# Patient Record
Sex: MALE | Race: WHITE | Employment: FULL TIME | URBAN - METROPOLITAN AREA
[De-identification: names, ages, dates, MRNs, and addresses within clinical notes are randomized per-mention and may not be internally consistent; named-entity substitution may affect disease eponyms.]

---

## 2018-12-06 ENCOUNTER — HOSPITAL ENCOUNTER (EMERGENCY)
Age: 43
Discharge: HOME | End: 2018-12-06
Attending: EMERGENCY MEDICINE
Payer: COMMERCIAL

## 2018-12-06 ENCOUNTER — APPOINTMENT (OUTPATIENT)
Dept: GENERAL RADIOLOGY | Age: 43
End: 2018-12-06
Payer: COMMERCIAL

## 2018-12-06 VITALS
SYSTOLIC BLOOD PRESSURE: 141 MMHG | RESPIRATION RATE: 16 BRPM | DIASTOLIC BLOOD PRESSURE: 73 MMHG | HEART RATE: 75 BPM | TEMPERATURE: 97.9 F | OXYGEN SATURATION: 98 %

## 2018-12-06 DIAGNOSIS — R42 LIGHTHEADEDNESS: Primary | ICD-10-CM

## 2018-12-06 LAB
ALBUMIN SERPL-MCNC: 4.6 G/DL (ref 3.5–5.2)
ALP BLD-CCNC: 84 U/L (ref 40–129)
ALT SERPL-CCNC: 37 U/L (ref 0–40)
ANION GAP SERPL CALCULATED.3IONS-SCNC: 13 MMOL/L (ref 7–16)
AST SERPL-CCNC: 23 U/L (ref 0–39)
BASOPHILS ABSOLUTE: 0.05 E9/L (ref 0–0.2)
BASOPHILS RELATIVE PERCENT: 0.9 % (ref 0–2)
BILIRUB SERPL-MCNC: 0.5 MG/DL (ref 0–1.2)
BUN BLDV-MCNC: 16 MG/DL (ref 6–20)
CALCIUM SERPL-MCNC: 9.2 MG/DL (ref 8.6–10.2)
CHLORIDE BLD-SCNC: 101 MMOL/L (ref 98–107)
CO2: 23 MMOL/L (ref 22–29)
CREAT SERPL-MCNC: 0.9 MG/DL (ref 0.7–1.2)
D DIMER: <200 NG/ML DDU
EKG ATRIAL RATE: 64 BPM
EKG P AXIS: 52 DEGREES
EKG P-R INTERVAL: 132 MS
EKG Q-T INTERVAL: 410 MS
EKG QRS DURATION: 94 MS
EKG QTC CALCULATION (BAZETT): 422 MS
EKG R AXIS: 74 DEGREES
EKG T AXIS: 43 DEGREES
EKG VENTRICULAR RATE: 64 BPM
EOSINOPHILS ABSOLUTE: 0.16 E9/L (ref 0.05–0.5)
EOSINOPHILS RELATIVE PERCENT: 2.8 % (ref 0–6)
GFR AFRICAN AMERICAN: >60
GFR NON-AFRICAN AMERICAN: >60 ML/MIN/1.73
GLUCOSE BLD-MCNC: 105 MG/DL (ref 74–99)
HCT VFR BLD CALC: 45.7 % (ref 37–54)
HEMOGLOBIN: 16.1 G/DL (ref 12.5–16.5)
IMMATURE GRANULOCYTES #: 0.02 E9/L
IMMATURE GRANULOCYTES %: 0.3 % (ref 0–5)
LYMPHOCYTES ABSOLUTE: 1.26 E9/L (ref 1.5–4)
LYMPHOCYTES RELATIVE PERCENT: 21.9 % (ref 20–42)
MAGNESIUM: 2.2 MG/DL (ref 1.6–2.6)
MCH RBC QN AUTO: 31.2 PG (ref 26–35)
MCHC RBC AUTO-ENTMCNC: 35.2 % (ref 32–34.5)
MCV RBC AUTO: 88.6 FL (ref 80–99.9)
MONOCYTES ABSOLUTE: 0.74 E9/L (ref 0.1–0.95)
MONOCYTES RELATIVE PERCENT: 12.9 % (ref 2–12)
NEUTROPHILS ABSOLUTE: 3.52 E9/L (ref 1.8–7.3)
NEUTROPHILS RELATIVE PERCENT: 61.2 % (ref 43–80)
PDW BLD-RTO: 12.4 FL (ref 11.5–15)
PLATELET # BLD: 298 E9/L (ref 130–450)
PMV BLD AUTO: 9.7 FL (ref 7–12)
POTASSIUM SERPL-SCNC: 4.1 MMOL/L (ref 3.5–5)
RBC # BLD: 5.16 E12/L (ref 3.8–5.8)
SODIUM BLD-SCNC: 137 MMOL/L (ref 132–146)
TOTAL PROTEIN: 7.1 G/DL (ref 6.4–8.3)
TROPONIN: <0.01 NG/ML (ref 0–0.03)
WBC # BLD: 5.8 E9/L (ref 4.5–11.5)

## 2018-12-06 PROCEDURE — 80053 COMPREHEN METABOLIC PANEL: CPT

## 2018-12-06 PROCEDURE — 2580000003 HC RX 258: Performed by: EMERGENCY MEDICINE

## 2018-12-06 PROCEDURE — 85378 FIBRIN DEGRADE SEMIQUANT: CPT

## 2018-12-06 PROCEDURE — 93005 ELECTROCARDIOGRAM TRACING: CPT | Performed by: EMERGENCY MEDICINE

## 2018-12-06 PROCEDURE — 83735 ASSAY OF MAGNESIUM: CPT

## 2018-12-06 PROCEDURE — 85025 COMPLETE CBC W/AUTO DIFF WBC: CPT

## 2018-12-06 PROCEDURE — 71045 X-RAY EXAM CHEST 1 VIEW: CPT

## 2018-12-06 PROCEDURE — 84484 ASSAY OF TROPONIN QUANT: CPT

## 2018-12-06 PROCEDURE — 99284 EMERGENCY DEPT VISIT MOD MDM: CPT

## 2018-12-06 RX ORDER — HYDROXYZINE PAMOATE 50 MG/1
50 CAPSULE ORAL 3 TIMES DAILY PRN
Qty: 30 CAPSULE | Refills: 0 | Status: SHIPPED | OUTPATIENT
Start: 2018-12-06 | End: 2018-12-16

## 2018-12-06 RX ORDER — 0.9 % SODIUM CHLORIDE 0.9 %
500 INTRAVENOUS SOLUTION INTRAVENOUS ONCE
Status: COMPLETED | OUTPATIENT
Start: 2018-12-06 | End: 2018-12-06

## 2018-12-06 RX ADMIN — SODIUM CHLORIDE 500 ML: 9 INJECTION, SOLUTION INTRAVENOUS at 11:48

## 2018-12-06 NOTE — ED PROVIDER NOTES
Platelets [phone] 472 E9/L    MPV 9.7 7.0 - 12.0 fL    Neutrophils % 61.2 43.0 - 80.0 %    Immature Granulocytes % 0.3 0.0 - 5.0 %    Lymphocytes % 21.9 20.0 - 42.0 %    Monocytes % 12.9 (H) 2.0 - 12.0 %    Eosinophils % 2.8 0.0 - 6.0 %    Basophils % 0.9 0.0 - 2.0 %    Neutrophils # 3.52 1.80 - 7.30 E9/L    Immature Granulocytes # 0.02 E9/L    Lymphocytes # 1.26 (L) 1.50 - 4.00 E9/L    Monocytes # 0.74 0.10 - 0.95 E9/L    Eosinophils # 0.16 0.05 - 0.50 E9/L    Basophils # 0.05 0.00 - 0.20 E9/L   Comprehensive Metabolic Panel   Result Value Ref Range    Sodium 137 132 - 146 mmol/L    Potassium 4.1 3.5 - 5.0 mmol/L    Chloride 101 98 - 107 mmol/L    CO2 23 22 - 29 mmol/L    Anion Gap 13 7 - 16 mmol/L    Glucose 105 (H) 74 - 99 mg/dL    BUN 16 6 - 20 mg/dL    CREATININE 0.9 0.7 - 1.2 mg/dL    GFR Non-African American >60 >=60 mL/min/1.73    GFR African American >60     Calcium 9.2 8.6 - 10.2 mg/dL    Total Protein 7.1 6.4 - 8.3 g/dL    Alb 4.6 3.5 - 5.2 g/dL    Total Bilirubin 0.5 0.0 - 1.2 mg/dL    Alkaline Phosphatase 84 40 - 129 U/L    ALT 37 0 - 40 U/L    AST 23 0 - 39 U/L   Troponin   Result Value Ref Range    Troponin <0.01 0.00 - 0.03 ng/mL   Magnesium   Result Value Ref Range    Magnesium 2.2 1.6 - 2.6 mg/dL   D-DIMER, QUANTITATIVE   Result Value Ref Range    D-Dimer, Quant <200 ng/mL DDU   EKG 12 Lead   Result Value Ref Range    Ventricular Rate 64 BPM    Atrial Rate 64 BPM    P-R Interval 132 ms    QRS Duration 94 ms    Q-T Interval 410 ms    QTc Calculation (Bazett) 422 ms    P Axis 52 degrees    R Axis 74 degrees    T Axis 43 degrees       RADIOLOGY:  Interpreted by Radiologist.  XR CHEST PORTABLE   Final Result   No radiographic evidence of acute cardiopulmonary disease. EKG: This EKG is signed and interpreted by me.     Rate: 66  Rhythm: Sinus  Interpretation: no acute changes  Comparison: None    ------------------------- NURSING NOTES AND VITALS REVIEWED ---------------------------   The agreeable with the plan.      --------------------------------- IMPRESSION AND DISPOSITION ---------------------------------    IMPRESSION  1. Lightheadedness        DISPOSITION  Disposition: Discharge to home  Patient condition is stable    12/6/18, 11:25 AM.    This note is prepared by Damaris Gallego, acting as Scribe for Koki Godinez DO:  The scribe's documentation has been prepared under my direction and personally reviewed by me in its entirety. I confirm that the note above accurately reflects all work, treatment, procedures, and medical decision making performed by me.              Katja Simeon DO  12/06/18 3761

## 2019-09-10 ENCOUNTER — APPOINTMENT (OUTPATIENT)
Dept: CT IMAGING | Age: 44
End: 2019-09-10
Payer: COMMERCIAL

## 2019-09-10 ENCOUNTER — APPOINTMENT (OUTPATIENT)
Dept: ULTRASOUND IMAGING | Age: 44
End: 2019-09-10
Payer: COMMERCIAL

## 2019-09-10 ENCOUNTER — HOSPITAL ENCOUNTER (EMERGENCY)
Age: 44
Discharge: HOME | End: 2019-09-10
Attending: EMERGENCY MEDICINE
Payer: COMMERCIAL

## 2019-09-10 VITALS
OXYGEN SATURATION: 97 % | HEART RATE: 89 BPM | DIASTOLIC BLOOD PRESSURE: 82 MMHG | TEMPERATURE: 99.5 F | BODY MASS INDEX: 33.34 KG/M2 | SYSTOLIC BLOOD PRESSURE: 147 MMHG | WEIGHT: 220 LBS | RESPIRATION RATE: 18 BRPM | HEIGHT: 68 IN

## 2019-09-10 DIAGNOSIS — N45.2 ORCHITIS: Primary | ICD-10-CM

## 2019-09-10 DIAGNOSIS — R04.2 HEMOPTYSIS: ICD-10-CM

## 2019-09-10 DIAGNOSIS — N43.3 HYDROCELE, UNSPECIFIED HYDROCELE TYPE: ICD-10-CM

## 2019-09-10 LAB
ANION GAP SERPL CALCULATED.3IONS-SCNC: 10 MMOL/L (ref 7–16)
BACTERIA: NORMAL /HPF
BASOPHILS ABSOLUTE: 0.03 E9/L (ref 0–0.2)
BASOPHILS RELATIVE PERCENT: 0.3 % (ref 0–2)
BILIRUBIN URINE: NEGATIVE
BLOOD, URINE: NEGATIVE
BUN BLDV-MCNC: 15 MG/DL (ref 6–20)
CALCIUM SERPL-MCNC: 9.3 MG/DL (ref 8.6–10.2)
CHLORIDE BLD-SCNC: 100 MMOL/L (ref 98–107)
CLARITY: CLEAR
CO2: 26 MMOL/L (ref 22–29)
COLOR: YELLOW
CREAT SERPL-MCNC: 0.9 MG/DL (ref 0.7–1.2)
D DIMER: 406 NG/ML DDU
EKG ATRIAL RATE: 82 BPM
EKG P AXIS: 37 DEGREES
EKG P-R INTERVAL: 126 MS
EKG Q-T INTERVAL: 352 MS
EKG QRS DURATION: 92 MS
EKG QTC CALCULATION (BAZETT): 411 MS
EKG R AXIS: 74 DEGREES
EKG T AXIS: 1 DEGREES
EKG VENTRICULAR RATE: 82 BPM
EOSINOPHILS ABSOLUTE: 0.02 E9/L (ref 0.05–0.5)
EOSINOPHILS RELATIVE PERCENT: 0.2 % (ref 0–6)
GFR AFRICAN AMERICAN: >60
GFR NON-AFRICAN AMERICAN: >60 ML/MIN/1.73
GLUCOSE BLD-MCNC: 88 MG/DL (ref 74–99)
GLUCOSE URINE: NEGATIVE MG/DL
HCT VFR BLD CALC: 41.7 % (ref 37–54)
HEMOGLOBIN: 14.4 G/DL (ref 12.5–16.5)
IMMATURE GRANULOCYTES #: 0.03 E9/L
IMMATURE GRANULOCYTES %: 0.3 % (ref 0–5)
KETONES, URINE: NEGATIVE MG/DL
LACTIC ACID: 0.8 MMOL/L (ref 0.5–2.2)
LEUKOCYTE ESTERASE, URINE: NEGATIVE
LYMPHOCYTES ABSOLUTE: 1.06 E9/L (ref 1.5–4)
LYMPHOCYTES RELATIVE PERCENT: 11.4 % (ref 20–42)
MCH RBC QN AUTO: 30.5 PG (ref 26–35)
MCHC RBC AUTO-ENTMCNC: 34.5 % (ref 32–34.5)
MCV RBC AUTO: 88.3 FL (ref 80–99.9)
MONOCYTES ABSOLUTE: 1.09 E9/L (ref 0.1–0.95)
MONOCYTES RELATIVE PERCENT: 11.8 % (ref 2–12)
NEUTROPHILS ABSOLUTE: 7.04 E9/L (ref 1.8–7.3)
NEUTROPHILS RELATIVE PERCENT: 76 % (ref 43–80)
NITRITE, URINE: NEGATIVE
PDW BLD-RTO: 11.9 FL (ref 11.5–15)
PH UA: 6.5 (ref 5–9)
PLATELET # BLD: 248 E9/L (ref 130–450)
PMV BLD AUTO: 10.1 FL (ref 7–12)
POTASSIUM SERPL-SCNC: 3.9 MMOL/L (ref 3.5–5)
PRO-BNP: 323 PG/ML (ref 0–125)
PROTEIN UA: NEGATIVE MG/DL
RBC # BLD: 4.72 E12/L (ref 3.8–5.8)
RBC UA: NORMAL /HPF (ref 0–2)
SODIUM BLD-SCNC: 136 MMOL/L (ref 132–146)
SPECIFIC GRAVITY UA: 1.01 (ref 1–1.03)
TROPONIN: <0.01 NG/ML (ref 0–0.03)
UROBILINOGEN, URINE: 1 E.U./DL
WBC # BLD: 9.3 E9/L (ref 4.5–11.5)
WBC UA: NORMAL /HPF (ref 0–5)

## 2019-09-10 PROCEDURE — 76870 US EXAM SCROTUM: CPT

## 2019-09-10 PROCEDURE — 85378 FIBRIN DEGRADE SEMIQUANT: CPT

## 2019-09-10 PROCEDURE — 6360000004 HC RX CONTRAST MEDICATION: Performed by: RADIOLOGY

## 2019-09-10 PROCEDURE — 83880 ASSAY OF NATRIURETIC PEPTIDE: CPT

## 2019-09-10 PROCEDURE — 99285 EMERGENCY DEPT VISIT HI MDM: CPT

## 2019-09-10 PROCEDURE — 93010 ELECTROCARDIOGRAM REPORT: CPT | Performed by: INTERNAL MEDICINE

## 2019-09-10 PROCEDURE — 6370000000 HC RX 637 (ALT 250 FOR IP): Performed by: EMERGENCY MEDICINE

## 2019-09-10 PROCEDURE — 83605 ASSAY OF LACTIC ACID: CPT

## 2019-09-10 PROCEDURE — 6360000002 HC RX W HCPCS: Performed by: EMERGENCY MEDICINE

## 2019-09-10 PROCEDURE — 87088 URINE BACTERIA CULTURE: CPT

## 2019-09-10 PROCEDURE — 87591 N.GONORRHOEAE DNA AMP PROB: CPT

## 2019-09-10 PROCEDURE — 84484 ASSAY OF TROPONIN QUANT: CPT

## 2019-09-10 PROCEDURE — 87491 CHLMYD TRACH DNA AMP PROBE: CPT

## 2019-09-10 PROCEDURE — 93005 ELECTROCARDIOGRAM TRACING: CPT | Performed by: EMERGENCY MEDICINE

## 2019-09-10 PROCEDURE — 71275 CT ANGIOGRAPHY CHEST: CPT

## 2019-09-10 PROCEDURE — 2500000003 HC RX 250 WO HCPCS: Performed by: EMERGENCY MEDICINE

## 2019-09-10 PROCEDURE — 81001 URINALYSIS AUTO W/SCOPE: CPT

## 2019-09-10 PROCEDURE — 85025 COMPLETE CBC W/AUTO DIFF WBC: CPT

## 2019-09-10 PROCEDURE — 80048 BASIC METABOLIC PNL TOTAL CA: CPT

## 2019-09-10 PROCEDURE — 93975 VASCULAR STUDY: CPT

## 2019-09-10 PROCEDURE — 96372 THER/PROPH/DIAG INJ SC/IM: CPT

## 2019-09-10 RX ORDER — CIPROFLOXACIN 500 MG/1
500 TABLET, FILM COATED ORAL 2 TIMES DAILY
Qty: 20 TABLET | Refills: 0 | Status: SHIPPED | OUTPATIENT
Start: 2019-09-10 | End: 2019-09-20

## 2019-09-10 RX ORDER — AZITHROMYCIN 250 MG/1
1000 TABLET, FILM COATED ORAL ONCE
Status: COMPLETED | OUTPATIENT
Start: 2019-09-10 | End: 2019-09-10

## 2019-09-10 RX ADMIN — CEFTRIAXONE SODIUM 250 MG: 250 INJECTION, POWDER, FOR SOLUTION INTRAMUSCULAR; INTRAVENOUS at 18:05

## 2019-09-10 RX ADMIN — AZITHROMYCIN 1000 MG: 250 TABLET, FILM COATED ORAL at 18:05

## 2019-09-10 RX ADMIN — IOPAMIDOL 85 ML: 755 INJECTION, SOLUTION INTRAVENOUS at 19:10

## 2019-09-10 ASSESSMENT — PAIN SCALES - GENERAL: PAINLEVEL_OUTOF10: 5

## 2019-09-10 ASSESSMENT — PAIN DESCRIPTION - LOCATION: LOCATION: HEAD

## 2019-09-10 ASSESSMENT — PAIN DESCRIPTION - ORIENTATION: ORIENTATION: LEFT

## 2019-09-10 NOTE — ED PROVIDER NOTES
7.0 - 12.0 fL    Neutrophils % 76.0 43.0 - 80.0 %    Immature Granulocytes % 0.3 0.0 - 5.0 %    Lymphocytes % 11.4 (L) 20.0 - 42.0 %    Monocytes % 11.8 2.0 - 12.0 %    Eosinophils % 0.2 0.0 - 6.0 %    Basophils % 0.3 0.0 - 2.0 %    Neutrophils Absolute 7.04 1.80 - 7.30 E9/L    Immature Granulocytes # 0.03 E9/L    Lymphocytes Absolute 1.06 (L) 1.50 - 4.00 E9/L    Monocytes Absolute 1.09 (H) 0.10 - 0.95 E9/L    Eosinophils Absolute 0.02 (L) 0.05 - 0.50 E9/L    Basophils Absolute 0.03 0.00 - 0.20 Z3/G   Basic Metabolic Panel   Result Value Ref Range    Sodium 136 132 - 146 mmol/L    Potassium 3.9 3.5 - 5.0 mmol/L    Chloride 100 98 - 107 mmol/L    CO2 26 22 - 29 mmol/L    Anion Gap 10 7 - 16 mmol/L    Glucose 88 74 - 99 mg/dL    BUN 15 6 - 20 mg/dL    CREATININE 0.9 0.7 - 1.2 mg/dL    GFR Non-African American >60 >=60 mL/min/1.73    GFR African American >60     Calcium 9.3 8.6 - 10.2 mg/dL   Troponin   Result Value Ref Range    Troponin <0.01 0.00 - 0.03 ng/mL   D-Dimer, Quantitative   Result Value Ref Range    D-Dimer, Quant 406 ng/mL DDU   Brain Natriuretic Peptide   Result Value Ref Range    Pro- (H) 0 - 125 pg/mL   Lactic Acid, Plasma   Result Value Ref Range    Lactic Acid 0.8 0.5 - 2.2 mmol/L   Urinalysis with Microscopic   Result Value Ref Range    Color, UA Yellow Straw/Yellow    Clarity, UA Clear Clear    Glucose, Ur Negative Negative mg/dL    Bilirubin Urine Negative Negative    Ketones, Urine Negative Negative mg/dL    Specific Gravity, UA 1.015 1.005 - 1.030    Blood, Urine Negative Negative    pH, UA 6.5 5.0 - 9.0    Protein, UA Negative Negative mg/dL    Urobilinogen, Urine 1.0 <2.0 E.U./dL    Nitrite, Urine Negative Negative    Leukocyte Esterase, Urine Negative Negative    WBC, UA NONE 0 - 5 /HPF    RBC, UA 0-1 0 - 2 /HPF    Bacteria, UA NONE /HPF   EKG 12 Lead   Result Value Ref Range    Ventricular Rate 82 BPM    Atrial Rate 82 BPM    P-R Interval 126 ms    QRS Duration 92 ms    Q-T Interval

## 2019-09-10 NOTE — DISCHARGE INSTR - COC
output data in the 24 hours ending 09/10/19 1930  No intake/output data recorded.     Safety Concerns:     [address] [facility] Safety Concerns:832986587}    Impairments/Disabilities:      [address] [facility] Impairments/Disabilities:451314892}    Nutrition Therapy:  Current Nutrition Therapy:   [address] [facility] Diet List:053820364}    Routes of Feeding: {CHP DME Other Feedings:363739945}  Liquids: {Slp liquid thickness:99371}  Daily Fluid Restriction: {CHP DME Yes amt example:524484959}  Last Modified Barium Swallow with Video (Video Swallowing Test): {Done Not Done RFIX:615431100}    Treatments at the Time of Hospital Discharge:   Respiratory Treatments: ***  Oxygen Therapy:  {Therapy; copd oxygen:12142}  Ventilator:    { CC Vent DBFX:070274522}    Rehab Therapies: {THERAPEUTIC INTERVENTION:9123235015}  Weight Bearing Status/Restrictions: [facility] Erlinda Pederson  Weight Bearin}  Other Medical Equipment (for information only, NOT a DME order):  {EQUIPMENT:834949166}  Other Treatments: ***    Patient's personal belongings (please select all that are sent with patient):  {CHP DME Belongings:937644574}    RN SIGNATURE:  {Esignature:431578351}    CASE MANAGEMENT/SOCIAL WORK SECTION    Inpatient Status Date: ***    Readmission Risk Assessment Score:  Readmission Risk              Risk of Unplanned Readmission:        0           Discharging to Facility/ Agency   · Name:   · Address:  · Phone:  · Fax:    Dialysis Facility (if applicable)   · Name:  · Address:  · Dialysis Schedule:  · Phone:  · Fax:    / signature: {Esignature:677111829}    PHYSICIAN SECTION    Prognosis: {Prognosis:0457492835}    Condition at Discharge: [address] Patient Condition:593396613}    Rehab Potential (if transferring to Rehab): {Prognosis:8581030347}    Recommended Labs or Other Treatments After Discharge: ***    Physician Certification: I certify the above information and transfer of Kasia Larry  is necessary for the continuing treatment of the diagnosis

## 2019-09-13 LAB
URINE CULTURE, ROUTINE: NORMAL
URINE CULTURE, ROUTINE: NORMAL

## 2019-09-16 LAB
C. TRACHOMATIS DNA ,URINE: NEGATIVE
N. GONORRHOEAE DNA, URINE: NEGATIVE
SOURCE: NORMAL

## 2020-09-29 RX ORDER — PREDNISONE 10 MG/1
TABLET ORAL
Qty: 18 TABLET | Refills: 0 | Status: SHIPPED | OUTPATIENT
Start: 2020-09-29 | End: 2020-10-08

## 2020-11-09 ENCOUNTER — OFFICE VISIT (OUTPATIENT)
Dept: PRIMARY CARE CLINIC | Age: 45
End: 2020-11-09

## 2021-07-16 ENCOUNTER — OFFICE VISIT (OUTPATIENT)
Dept: FAMILY MEDICINE CLINIC | Age: 46
End: 2021-07-16
Payer: COMMERCIAL

## 2021-07-16 VITALS
HEART RATE: 74 BPM | BODY MASS INDEX: 33.8 KG/M2 | OXYGEN SATURATION: 98 % | SYSTOLIC BLOOD PRESSURE: 134 MMHG | WEIGHT: 223 LBS | TEMPERATURE: 97.2 F | HEIGHT: 68 IN | DIASTOLIC BLOOD PRESSURE: 88 MMHG

## 2021-07-16 DIAGNOSIS — Z00.00 ROUTINE GENERAL MEDICAL EXAMINATION AT A HEALTH CARE FACILITY: Primary | ICD-10-CM

## 2021-07-16 DIAGNOSIS — Z12.5 PROSTATE CANCER SCREENING: ICD-10-CM

## 2021-07-16 DIAGNOSIS — Z00.00 ROUTINE GENERAL MEDICAL EXAMINATION AT A HEALTH CARE FACILITY: ICD-10-CM

## 2021-07-16 LAB
ALBUMIN SERPL-MCNC: 4.3 G/DL (ref 3.5–5.2)
ALP BLD-CCNC: 73 U/L (ref 40–129)
ALT SERPL-CCNC: 27 U/L (ref 0–40)
ANION GAP SERPL CALCULATED.3IONS-SCNC: 13 MMOL/L (ref 7–16)
AST SERPL-CCNC: 20 U/L (ref 0–39)
BILIRUB SERPL-MCNC: 0.5 MG/DL (ref 0–1.2)
BILIRUBIN URINE: NEGATIVE
BLOOD, URINE: NEGATIVE
BUN BLDV-MCNC: 18 MG/DL (ref 6–20)
CALCIUM SERPL-MCNC: 9.6 MG/DL (ref 8.6–10.2)
CHLORIDE BLD-SCNC: 104 MMOL/L (ref 98–107)
CHOLESTEROL, TOTAL: 203 MG/DL (ref 0–199)
CLARITY: CLEAR
CO2: 26 MMOL/L (ref 22–29)
COLOR: YELLOW
CREAT SERPL-MCNC: 0.9 MG/DL (ref 0.7–1.2)
GFR AFRICAN AMERICAN: >60
GFR NON-AFRICAN AMERICAN: >60 ML/MIN/1.73
GLUCOSE FASTING: 106 MG/DL (ref 74–99)
GLUCOSE URINE: NEGATIVE MG/DL
HCT VFR BLD CALC: 44.5 % (ref 37–54)
HDLC SERPL-MCNC: 49 MG/DL
HEMOGLOBIN: 15.4 G/DL (ref 12.5–16.5)
KETONES, URINE: NEGATIVE MG/DL
LDL CHOLESTEROL CALCULATED: 128 MG/DL (ref 0–99)
LEUKOCYTE ESTERASE, URINE: NEGATIVE
MCH RBC QN AUTO: 31 PG (ref 26–35)
MCHC RBC AUTO-ENTMCNC: 34.6 % (ref 32–34.5)
MCV RBC AUTO: 89.7 FL (ref 80–99.9)
NITRITE, URINE: NEGATIVE
PDW BLD-RTO: 12.3 FL (ref 11.5–15)
PH UA: 6 (ref 5–9)
PLATELET # BLD: 301 E9/L (ref 130–450)
PMV BLD AUTO: 10.4 FL (ref 7–12)
POTASSIUM SERPL-SCNC: 4.1 MMOL/L (ref 3.5–5)
PROSTATE SPECIFIC ANTIGEN: 0.82 NG/ML (ref 0–4)
PROTEIN UA: NEGATIVE MG/DL
RBC # BLD: 4.96 E12/L (ref 3.8–5.8)
SODIUM BLD-SCNC: 143 MMOL/L (ref 132–146)
SPECIFIC GRAVITY UA: 1.01 (ref 1–1.03)
TOTAL PROTEIN: 7.2 G/DL (ref 6.4–8.3)
TRIGL SERPL-MCNC: 132 MG/DL (ref 0–149)
TSH SERPL DL<=0.05 MIU/L-ACNC: 0.56 UIU/ML (ref 0.27–4.2)
UROBILINOGEN, URINE: 0.2 E.U./DL
VLDLC SERPL CALC-MCNC: 26 MG/DL
WBC # BLD: 5.3 E9/L (ref 4.5–11.5)

## 2021-07-16 PROCEDURE — 99396 PREV VISIT EST AGE 40-64: CPT | Performed by: FAMILY MEDICINE

## 2021-07-16 ASSESSMENT — ENCOUNTER SYMPTOMS
ALLERGIC/IMMUNOLOGIC NEGATIVE: 1
COUGH: 0
SHORTNESS OF BREATH: 0
EYE DISCHARGE: 0
EYE PAIN: 0
NAUSEA: 0
VOMITING: 0
DIARRHEA: 0
PHOTOPHOBIA: 0
EYE REDNESS: 0
SORE THROAT: 0
BLOOD IN STOOL: 0
CHEST TIGHTNESS: 0
SINUS PAIN: 0
TROUBLE SWALLOWING: 0
ABDOMINAL PAIN: 0
BACK PAIN: 0

## 2021-07-16 ASSESSMENT — PATIENT HEALTH QUESTIONNAIRE - PHQ9
1. LITTLE INTEREST OR PLEASURE IN DOING THINGS: 0
SUM OF ALL RESPONSES TO PHQ QUESTIONS 1-9: 0
SUM OF ALL RESPONSES TO PHQ QUESTIONS 1-9: 0
SUM OF ALL RESPONSES TO PHQ9 QUESTIONS 1 & 2: 0
SUM OF ALL RESPONSES TO PHQ QUESTIONS 1-9: 0
2. FEELING DOWN, DEPRESSED OR HOPELESS: 0

## 2021-07-16 NOTE — PROGRESS NOTES
21  Laura Ace : 1975 Sex: male  Age: 42 y.o. Assessment and Plan:  Marleen Bennett was seen today for annual exam.    Diagnoses and all orders for this visit:    Routine general medical examination at a health care facility    Prostate cancer screening        No follow-ups on file. Chief Complaint   Patient presents with    Annual Exam       Patient here for annual wellness visit. Been doing well, denies any new complaints. Following with urology for his testicular her history. All words to this point have been fine. Review of Systems   Constitutional: Negative for appetite change, fatigue and unexpected weight change. HENT: Negative for congestion, ear pain, hearing loss, sinus pain, sore throat and trouble swallowing. Eyes: Negative for photophobia, pain, discharge and redness. Respiratory: Negative for cough, chest tightness and shortness of breath. Cardiovascular: Negative for chest pain, palpitations and leg swelling. Gastrointestinal: Negative for abdominal pain, blood in stool, diarrhea, nausea and vomiting. Endocrine: Negative. Genitourinary: Negative for dysuria, flank pain, frequency and hematuria. Musculoskeletal: Negative for arthralgias, back pain, joint swelling and myalgias. Skin: Negative. Allergic/Immunologic: Negative. Neurological: Negative for dizziness, seizures, syncope, weakness, light-headedness, numbness and headaches. Hematological: Negative for adenopathy. Does not bruise/bleed easily. Psychiatric/Behavioral: Negative. No current outpatient medications on file. 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   Social History     Socioeconomic History    Marital status:      Spouse name: Not on file    Number of children: Not on file    Years of education: Not Cardiovascular:      Rate and Rhythm: Normal rate and regular rhythm. Heart sounds: No murmur heard. No friction rub. No gallop. Pulmonary:      Effort: Pulmonary effort is normal. No respiratory distress. Breath sounds: Normal breath sounds. Abdominal:      General: Bowel sounds are normal.      Palpations: Abdomen is soft. Musculoskeletal:         General: No tenderness or deformity. Normal range of motion. Cervical back: Normal range of motion and neck supple. Lymphadenopathy:      Cervical: No cervical adenopathy. Skin:     General: Skin is warm and dry. Capillary Refill: Capillary refill takes less than 2 seconds. Findings: No rash. Neurological:      Mental Status: He is alert and oriented to person, place, and time. Sensory: No sensory deficit. Motor: No abnormal muscle tone.       Coordination: Coordination normal.      Deep Tendon Reflexes: Reflexes normal.             Seen By:  Taryn Rivera DO

## 2022-07-15 ENCOUNTER — OFFICE VISIT (OUTPATIENT)
Dept: ORTHOPEDIC SURGERY | Age: 47
End: 2022-07-15
Payer: COMMERCIAL

## 2022-07-15 ENCOUNTER — OFFICE VISIT (OUTPATIENT)
Dept: FAMILY MEDICINE CLINIC | Age: 47
End: 2022-07-15
Payer: COMMERCIAL

## 2022-07-15 VITALS
TEMPERATURE: 97.7 F | HEIGHT: 68 IN | WEIGHT: 222 LBS | SYSTOLIC BLOOD PRESSURE: 142 MMHG | HEART RATE: 71 BPM | BODY MASS INDEX: 33.65 KG/M2 | OXYGEN SATURATION: 97 % | DIASTOLIC BLOOD PRESSURE: 90 MMHG | RESPIRATION RATE: 16 BRPM

## 2022-07-15 VITALS — BODY MASS INDEX: 33.65 KG/M2 | WEIGHT: 222 LBS | HEIGHT: 68 IN

## 2022-07-15 DIAGNOSIS — M25.561 ACUTE PAIN OF RIGHT KNEE: Primary | ICD-10-CM

## 2022-07-15 DIAGNOSIS — Z12.5 PROSTATE CANCER SCREENING: ICD-10-CM

## 2022-07-15 DIAGNOSIS — I10 PRIMARY HYPERTENSION: Primary | ICD-10-CM

## 2022-07-15 DIAGNOSIS — Z12.11 COLON CANCER SCREENING: ICD-10-CM

## 2022-07-15 DIAGNOSIS — I10 PRIMARY HYPERTENSION: ICD-10-CM

## 2022-07-15 DIAGNOSIS — M25.561 ACUTE PAIN OF RIGHT KNEE: ICD-10-CM

## 2022-07-15 LAB
ALBUMIN SERPL-MCNC: 4.7 G/DL (ref 3.5–5.2)
ALP BLD-CCNC: 84 U/L (ref 40–129)
ALT SERPL-CCNC: 30 U/L (ref 0–40)
ANION GAP SERPL CALCULATED.3IONS-SCNC: 15 MMOL/L (ref 7–16)
AST SERPL-CCNC: 25 U/L (ref 0–39)
BILIRUB SERPL-MCNC: 0.6 MG/DL (ref 0–1.2)
BILIRUBIN URINE: NEGATIVE
BLOOD, URINE: NEGATIVE
BUN BLDV-MCNC: 15 MG/DL (ref 6–20)
CALCIUM SERPL-MCNC: 9.7 MG/DL (ref 8.6–10.2)
CHLORIDE BLD-SCNC: 102 MMOL/L (ref 98–107)
CLARITY: CLEAR
CO2: 20 MMOL/L (ref 22–29)
COLOR: YELLOW
CREAT SERPL-MCNC: 0.9 MG/DL (ref 0.7–1.2)
GFR AFRICAN AMERICAN: >60
GFR NON-AFRICAN AMERICAN: >60 ML/MIN/1.73
GLUCOSE FASTING: 95 MG/DL (ref 74–99)
GLUCOSE URINE: NEGATIVE MG/DL
HCT VFR BLD CALC: 46.6 % (ref 37–54)
HEMOGLOBIN: 16 G/DL (ref 12.5–16.5)
KETONES, URINE: NEGATIVE MG/DL
LEUKOCYTE ESTERASE, URINE: NEGATIVE
MCH RBC QN AUTO: 31.2 PG (ref 26–35)
MCHC RBC AUTO-ENTMCNC: 34.3 % (ref 32–34.5)
MCV RBC AUTO: 90.8 FL (ref 80–99.9)
NITRITE, URINE: NEGATIVE
PDW BLD-RTO: 12.4 FL (ref 11.5–15)
PH UA: 6 (ref 5–9)
PLATELET # BLD: 331 E9/L (ref 130–450)
PMV BLD AUTO: 10.5 FL (ref 7–12)
POTASSIUM SERPL-SCNC: 4.3 MMOL/L (ref 3.5–5)
PROSTATE SPECIFIC ANTIGEN: 1.57 NG/ML (ref 0–4)
PROTEIN UA: NEGATIVE MG/DL
RBC # BLD: 5.13 E12/L (ref 3.8–5.8)
SODIUM BLD-SCNC: 137 MMOL/L (ref 132–146)
SPECIFIC GRAVITY UA: <=1.005 (ref 1–1.03)
TOTAL PROTEIN: 7.8 G/DL (ref 6.4–8.3)
TSH SERPL DL<=0.05 MIU/L-ACNC: 1.09 UIU/ML (ref 0.27–4.2)
UROBILINOGEN, URINE: 0.2 E.U./DL
WBC # BLD: 5.2 E9/L (ref 4.5–11.5)

## 2022-07-15 PROCEDURE — 99214 OFFICE O/P EST MOD 30 MIN: CPT | Performed by: FAMILY MEDICINE

## 2022-07-15 PROCEDURE — 81003 URINALYSIS AUTO W/O SCOPE: CPT | Performed by: FAMILY MEDICINE

## 2022-07-15 PROCEDURE — 99203 OFFICE O/P NEW LOW 30 MIN: CPT | Performed by: PHYSICIAN ASSISTANT

## 2022-07-15 RX ORDER — HYDROCHLOROTHIAZIDE 25 MG/1
25 TABLET ORAL EVERY MORNING
Qty: 30 TABLET | Refills: 0 | Status: SHIPPED | OUTPATIENT
Start: 2022-07-15

## 2022-07-15 RX ORDER — MELOXICAM 15 MG/1
15 TABLET ORAL DAILY
Qty: 30 TABLET | Refills: 0 | Status: SHIPPED | OUTPATIENT
Start: 2022-07-15

## 2022-07-15 ASSESSMENT — PATIENT HEALTH QUESTIONNAIRE - PHQ9
SUM OF ALL RESPONSES TO PHQ9 QUESTIONS 1 & 2: 0
SUM OF ALL RESPONSES TO PHQ QUESTIONS 1-9: 0
1. LITTLE INTEREST OR PLEASURE IN DOING THINGS: 0
SUM OF ALL RESPONSES TO PHQ QUESTIONS 1-9: 0
2. FEELING DOWN, DEPRESSED OR HOPELESS: 0

## 2022-07-15 NOTE — PROGRESS NOTES
7/15/22  Manasa Berry : 1975 Sex: male  Age: 41 y.o. Assessment and Plan:  Patricia Carrillo was seen today for knee pain and lower back pain. Diagnoses and all orders for this visit:    Primary hypertension  -     CBC; Future  -     Comprehensive Metabolic Panel, Fasting; Future  -     Lipid Panel; Future  -     TSH; Future  -     Urinalysis; Future  -     hydroCHLOROthiazide (HYDRODIURIL) 25 MG tablet; Take 1 tablet by mouth every morning    Acute pain of right knee  -     [address] [place] [address] In    Colon cancer screening  -     [facility] - Laisha Wheleer DO, Gastroenterology, [facility]    Prostate cancer screening  -     PSA Screening; Future    Patient is now hypertensive, will do coronary fasting blood work and start him on hydrochlorothiazide with recheck in 1 month. The has had pain in his right knee over the last 3 to 6 months, will need orthopedic referral.  He had a walk-in orthopedics will be seeing him today. He has a history of inflammatory bowel disease in his family, he is 41, will get GI referral for screening colonoscopy. Return in about 4 weeks (around 2022), or Recheck blood pressure. Chief Complaint   Patient presents with    Knee Pain     R knee, Pt states he notices the pain when he walks down stairs    Lower Back Pain       The patient is here for blood pressure check. Patient has been taking their medications as prescribed. No recent changes to their medications. No headache or visual disturbances. No dizziness or tinnitus. No chest pain, palpitations, or dyspnea. No nausea and vomiting. No numbness, tingling and or weakness to the extremities. No fevers or chills. No recent illness. Pt reports pain to the right knee. The patient states the pain has been present for the last 3 to 6 months datient twisted the knee and has been having pain since the injury. Pain is currently a 3/10 on the pain scale. Patient is having difficulty ambulating.   Pain is worse with weight bearing or going down steps. Pain is worse with movement. Knee is not locking or buckling. They deny numbness or tingling to the distal extremity. No redness and warmth to the affected area. No fever or chills. Review of Systems   Musculoskeletal:  Positive for arthralgias, gait problem and myalgias. 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 Social History     Socioeconomic History    Marital status:      Spouse name: Not on file    Number of children: Not on file    Years of education: Not on file    Highest education level: Not on file   Occupational History    Not on file   Tobacco Use    Smoking status: Never    Smokeless tobacco: Never   Vaping Use    Vaping Use: Never used   Substance and Sexual Activity    Alcohol use: No    Drug use: No    Sexual activity: Not on file   Other Topics Concern    Not on file   Social History Narrative    Not on file     Social Determinants of Health     Financial Resource Strain: Not on file   Food Insecurity: Not on file   Transportation Needs: Not on file   Physical Activity: Not on file   Stress: Not on file   Social Connections: Not on file   Intimate Partner Violence: Not on file   Housing Stability: Not on file       Vitals:    07/15/22 0911   BP: (!) 142/90   Pulse: 71   Resp: 16   Temp: 97.7 °F (36.5 °C)   TempSrc: Temporal   SpO2: 97%   Weight: 222 lb (100.7 kg)   Height: 5' 8\" (1.727 m)       Physical Exam  Vitals and nursing note reviewed. Constitutional:       Appearance: He is well-developed. HENT:      Head: Atraumatic.       Right Ear: External ear normal.      Left Ear: External ear normal. Nose: Nose normal.      Mouth/Throat:      Pharynx: No oropharyngeal exudate. Eyes:      Conjunctiva/sclera: Conjunctivae normal.      Pupils: Pupils are equal, round, and reactive to light. Neck:      Thyroid: No thyromegaly. Trachea: No tracheal deviation. Cardiovascular:      Rate and Rhythm: Normal rate and regular rhythm. Heart sounds: No murmur heard. No friction rub. No gallop. Pulmonary:      Effort: Pulmonary effort is normal. No respiratory distress. Breath sounds: Normal breath sounds. Abdominal:      General: Bowel sounds are normal.      Palpations: Abdomen is soft. Musculoskeletal:         General: No tenderness or deformity. Normal range of motion. Cervical back: Normal range of motion and neck supple. Comments: Pain, stiffness, decreased range of motion right knee. There is no redness, ecchymosis, swelling noted. Good color, pulses, sensation, range of motion distal leg. Lymphadenopathy:      Cervical: No cervical adenopathy. Skin:     General: Skin is warm and dry. Capillary Refill: Capillary refill takes less than 2 seconds. Findings: No rash. Neurological:      Mental Status: He is alert and oriented to person, place, and time. Sensory: No sensory deficit. Motor: No abnormal muscle tone.       Coordination: Coordination normal.      Deep Tendon Reflexes: Reflexes normal.           Seen By:  Enrique Louis DO

## 2022-07-15 NOTE — PROGRESS NOTES
[facility] In Care  New Patient Note      CHIEF COMPLAINT:   Chief Complaint   Patient presents with    Knee Pain     Pt presents this AM with c/o R knee pain. States that he has noticed pain for 6-8 months. States that recently it has worsened, specifically while descending stairs. States that he feels as though pain is medial, and underneath patella. States that he occasionally takes Tylenol for pain. HISTORY OF PRESENT ILLNESS:                The patient is a 41 y.o. male who presents today with right knee pain that has been present for the last 6-8 months with no known mechanism of injury. He states recently the pain has worsened and he notices it most presently when he is going downstairs or a ladder. He reports the pain is medial and does have some deep in the joint. He denies any numbness, tingling, loss of sensation or function or radiation of symptoms that travel down into his foot. He denies any calf pain. He states pain is worse with ambulation and stairs. He does have some discomfort if he is been seated for prolonged period of time and then changes position. He occasionally takes Tylenol over-the-counter for symptomatic relief. Otherwise, he has not had any at home therapies or treatments. 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 Allergies:  Patient has no known allergies. Social History:   TOBACCO:   reports that he has never smoked. He has never used smokeless tobacco.  ETOH:   reports no history of alcohol use. DRUGS:   reports no history of drug use. OCCUPATION: [employer]    Review of Systems   Constitutional: Negative for fever, chills, diaphoresis, appetite change and fatigue. HENT: Negative for dental issues, hearing loss and tinnitus.  Negative for congestion, sinus pressure, sneezing, sore throat. Negative for headache. Eyes: Negative for visual disturbance, blurred and double vision. Negative for pain, discharge, redness and itching  Respiratory: Negative for cough, shortness of breath and wheezing. Cardiovascular: Negative for chest pain, palpitations and leg swelling. No dyspnea on exertion   Gastrointestinal:   Negative for nausea, vomiting, abdominal pain, diarrhea, constipation  or black or bloody. Hematologic\Lymphatic:  negative for bleeding, petechiae,   Genitourinary: Negative for hematuria and difficulty urinating. Musculoskeletal: Negative for neck pain and stiffness. Negative for back pain, joint swelling and gait problem. + Right knee pain  Skin: Negative for pallor, rash and wound. Neurological: Negative for dizziness, tremors, seizures, weakness, light-headedness, no TIA or stroke symptoms. No numbness and headaches. Psychiatric/Behavioral: Negative. Physical Examination:   General appearance: alert, well appearing, and in no distress,  normal appearing weight  Mental status: alert, oriented to person, place, and time, normal mood, behavior, speech, dress, motor activity, and thought processes  Resp:   resp easy and unlabored, no audible wheezes note  Cardiac: distal pulses palpable, skin well perfused  Neurological: alert, oriented X3, normal speech, no focal findings or movement disorder noted, motor and sensory grossly normal bilaterally, normal muscle tone  HEENT: normochephalic atraumatic, external ears and eyes normal, sclera normal, neck supple  Extremities:   peripheral pulses normal, no edema, redness or tenderness in the calves   Skin: normal coloration, no rashes or open wounds, no suspicious skin lesions noted  Psych: Affect euthymic   Musculoskeletal:   On visual inspection there is no obvious deformity to the right knee, no erythema, edema, ecchymosis, open wounds.   There is no decreased sensation to light touch throughout the entire lower extremity. Patient is grossly neurovascularly intact. Right Knee: Patient is tender to Palpation medial joint line, not tender to palpation elsewhere throughout the knee. Active ROM Flexion 110, Extension 0, MMT 5/5 Flexion, 5/5 Extension  (-) Anterior/Posterior drawer, (-) Lachman, (-) Varus stress test, (-) Valgus stress test, (-) Dinesh, (-) Ballotable patella, (-) Patellar grind, (-) Suzanna      Ht 5' 8\" (1.727 m)   Wt 222 lb (100.7 kg)   BMI 33.75 kg/m²      XR: I did obtain 4 view right knee x-rays in the office today which were negative for fracture or dislocation. The imaging will be reviewed and interpreted by Radiologist.  The report was not complete at the time of this note. Please refer to Radiologist report for their interpretation. ASSESSMENT:   Diagnosis Orders   1. Acute pain of right knee  XR KNEE RIGHT (MIN 4 VIEWS)          PLAN:  Patient is a pleasant 72-year-old male who presents to the clinic today for evaluation of right knee pain that is been present for the last 6/8 months with no known mechanism of injury. His knee exam today is unremarkable. I did obtain 4 view right knee x-rays which show no evidence of fracture or dislocation. There is also no significant degenerative changes present. He has not had any treatment other than some over-the-counter Tylenol. I did recommend we treat this conservatively with oral medications, ice 20 minutes on 20 minutes off repeating throughout the day as necessary and an over-the-counter neoprene knee sleeve. At this time I have recommended starting an anti-inflammatory,  mobic 15mg 1 tab daily PRN pain , with GI precautions. If patient would develop any GI upset, nausea, vomiting, change in appetite, blood in stools he should discontinue the medication and contact our office immediately. They are also aware that he should not take any other over-the-counter anti-inflammatories while on this.   They can use Tylenol 500 mg 2 tablets every 8 hours as needed for pain in addition to the prescription anti-inflammatory provided with the visit today. I did provide him with a basic home exercise program for MCL strain. He can start these exercises early next week. Patient voiced understanding and agrees with treatment plan outlined for him in the office today. If he has additional questions or concerns he should call the office. If his pain is not improving or is worsening over the course of the next 7-10 days he should return to the clinic for further evaluation. Otherwise, I am more than happy to see him back on an as-needed basis. Electronically signed by Danish Dia PA-C on 7/15/22 at 10:32 AM EDT    **This report was transcribed using voice recognition software. Every effort was made to ensure accuracy; however, inadvertent computerized transcription errors may be present. **

## 2022-07-16 LAB
CHOLESTEROL, TOTAL: 195 MG/DL (ref 0–199)
HDLC SERPL-MCNC: 49 MG/DL
LDL CHOLESTEROL CALCULATED: 122 MG/DL (ref 0–99)
TRIGL SERPL-MCNC: 118 MG/DL (ref 0–149)
VLDLC SERPL CALC-MCNC: 24 MG/DL

## 2022-08-08 ENCOUNTER — TELEPHONE (OUTPATIENT)
Dept: FAMILY MEDICINE CLINIC | Age: 47
End: 2022-08-08

## 2022-08-08 NOTE — TELEPHONE ENCOUNTER
----- Message from Gaston Casey sent at 8/8/2022  2:00 PM EDT -----  Subject: Message to Provider    QUESTIONS  Information for Provider? pt stated that his urology department is   requesting his blood work that was done on 7.15.22 to be faxed over to   them number [phone] please follow up   ---------------------------------------------------------------------------  --------------  [phone]  [phone]; OK to leave message on voicemail  ---------------------------------------------------------------------------  --------------  SCRIPT ANSWERS  Relationship to Patient?  Self

## 2022-12-20 ENCOUNTER — OFFICE VISIT (OUTPATIENT)
Dept: FAMILY MEDICINE CLINIC | Age: 47
End: 2022-12-20

## 2022-12-20 VITALS
DIASTOLIC BLOOD PRESSURE: 78 MMHG | SYSTOLIC BLOOD PRESSURE: 122 MMHG | HEART RATE: 85 BPM | OXYGEN SATURATION: 97 % | TEMPERATURE: 98.9 F | BODY MASS INDEX: 33.34 KG/M2 | RESPIRATION RATE: 16 BRPM | HEIGHT: 68 IN | WEIGHT: 220 LBS

## 2022-12-20 DIAGNOSIS — Z20.828 EXPOSURE TO INFLUENZA: ICD-10-CM

## 2022-12-20 DIAGNOSIS — J06.9 URI WITH COUGH AND CONGESTION: ICD-10-CM

## 2022-12-20 DIAGNOSIS — J02.0 STREP PHARYNGITIS: Primary | ICD-10-CM

## 2022-12-20 LAB
INFLUENZA A ANTIBODY: NEGATIVE
INFLUENZA B ANTIBODY: NEGATIVE
S PYO AG THROAT QL: POSITIVE

## 2022-12-20 RX ORDER — AMOXICILLIN 500 MG/1
500 CAPSULE ORAL 2 TIMES DAILY
Qty: 20 CAPSULE | Refills: 0 | Status: SHIPPED | OUTPATIENT
Start: 2022-12-20 | End: 2022-12-30

## 2022-12-20 RX ORDER — BENZONATATE 200 MG/1
200 CAPSULE ORAL 3 TIMES DAILY PRN
Qty: 15 CAPSULE | Refills: 0 | Status: SHIPPED | OUTPATIENT
Start: 2022-12-20

## 2022-12-20 NOTE — PROGRESS NOTES
Chief Complaint       Cough (X 48 hours, negative for COVID at home, taking Mucinex) and Pharyngitis (Exposed to flu)      History of Present Illness   Source of history provided by:  patient. Jerry Thorpe is a 41 y.o. old male presenting to the walk in clinic for evaluation of sore throat, pain with swallowing, and nonproductive cough which is been present for the past 48 hours. Denies any fever, chills, loss of taste or smell, CP, dyspnea, LE edema, abdominal pain, vomiting, rash, or lethargy. Denies any hx of asthma, COPD, or tobacco use. Patient reports that his daughter was recently diagnosed with influenza. Patient has been vaccinated for COVID-19. Patient has been taking Mucinex OTC without symptomatic relief. ROS    Unless otherwise stated in this report or unable to obtain because of the patient's clinical or mental status as evidenced by the medical record, this patients's positive and negative responses for Review of Systems, constitutional, psych, eyes, ENT, cardiovascular, respiratory, gastrointestinal, neurological, genitourinary, musculoskeletal, integument systems and systems related to the presenting problem are either stated in the preceding or were not pertinent or were negative for the symptoms and/or complaints related to the medical problem. Past Medical History:  has a past medical history of Cancer ([facility] [address] Past Surgical History:  has a past surgical history that includes hernia repair. Social History:  reports that he has never smoked. He has never used smokeless tobacco. He reports that he does not drink alcohol and does not use drugs. Family History: family history is not on file. Allergies: Patient has no known allergies.     Physical Exam         VS:  /78   Pulse 85   Temp 98.9 °F (37.2 °C)   Resp 16   Ht 5' 8\" (1.727 m)   Wt 220 lb (99.8 kg)   SpO2 97%   BMI 33.45 kg/m²    Oxygen Saturation Interpretation: Normal.    Constitutional:  Alert, development consistent with age. NAD. Head:  NC/NT. Airway patent. Mild TTP noted over the bilateral maxillary sinuses  Mouth: Posterior pharynx with moderate erythema and tonsillar hypertrophy noted. No exudates noted. Neck:  Normal ROM. Supple. Tender anterior cervical adenopathy noted. Lungs: CTAB without wheezes, rales, or rhonchi. CV:  Regular rate and rhythm, normal heart sounds, without pathological murmurs, ectopy, gallops, or rubs. Skin:  Normal turgor. Warm, dry, without visible rash. Lymphatic: No lymphangitis or adenopathy noted. Neurological:  Oriented. Motor functions intact. Lab / Imaging Results   (All laboratory and radiology results have been personally reviewed by myself)  Labs:  Results for orders placed or performed in visit on 12/20/22   POCT Influenza A/B   Result Value Ref Range    Influenza A Ab negative     Influenza B Ab negative    POCT rapid strep A   Result Value Ref Range    Strep A Ag Positive (A) None Detected       Imaging: All Radiology results interpreted by Radiologist unless otherwise noted. Assessment / Plan     Impression(s):  Christina Andre was seen today for cough and pharyngitis. Diagnoses and all orders for this visit:    Strep pharyngitis  -     POCT rapid strep A  -     amoxicillin (AMOXIL) 500 MG capsule; Take 1 capsule by mouth 2 times daily for 10 days    URI with cough and congestion  -     POCT Influenza A/B  -     benzonatate (TESSALON) 200 MG capsule; Take 1 capsule by mouth 3 times daily as needed for Cough    Exposure to influenza  -     POCT Influenza A/B    Disposition:  Disposition: Discharge to home. Rapid influenza is negative in office today. Rapid strep is positive. Scripts written for amoxicillin and Tessalon Perles, side effects discussed. Increase fluids and rest. Symptomatic relief discussed including Tylenol prn pain/fever. Schedule f/u with PCP in 7-10 days if symptoms persist. ED sooner if symptoms worsen or change.  ED immediately with high or refractory fever, progressive SOB, dyspnea, CP, calf pain/swelling, shaking chills, vomiting, abdominal pain, lethargy, flank pain, or decreased urinary output. Pt verbalizes understanding and is in agreement with plan of care. All questions answered. Karena Kaur PA-C    **This report was transcribed using voice recognition software. Every effort was made to ensure accuracy; however, inadvertent computerized transcription errors may be present.

## 2023-02-23 LAB — PROSTATE SPECIFIC ANTIGEN: 0.95 NG/ML (ref 0–4)

## 2023-02-24 DIAGNOSIS — I10 PRIMARY HYPERTENSION: ICD-10-CM

## 2023-02-25 RX ORDER — HYDROCHLOROTHIAZIDE 25 MG/1
25 TABLET ORAL EVERY MORNING
Qty: 30 TABLET | Refills: 0 | Status: SHIPPED | OUTPATIENT
Start: 2023-02-25

## 2023-03-28 DIAGNOSIS — I10 PRIMARY HYPERTENSION: ICD-10-CM

## 2023-03-29 RX ORDER — HYDROCHLOROTHIAZIDE 25 MG/1
25 TABLET ORAL EVERY MORNING
Qty: 30 TABLET | Refills: 0 | Status: SHIPPED | OUTPATIENT
Start: 2023-03-29

## 2023-04-30 DIAGNOSIS — I10 PRIMARY HYPERTENSION: ICD-10-CM

## 2023-05-01 RX ORDER — HYDROCHLOROTHIAZIDE 25 MG/1
25 TABLET ORAL EVERY MORNING
Qty: 30 TABLET | Refills: 5 | Status: SHIPPED | OUTPATIENT
Start: 2023-05-01

## 2023-06-30 ASSESSMENT — PATIENT HEALTH QUESTIONNAIRE - PHQ9
SUM OF ALL RESPONSES TO PHQ9 QUESTIONS 1 & 2: 0
1. LITTLE INTEREST OR PLEASURE IN DOING THINGS: NOT AT ALL
SUM OF ALL RESPONSES TO PHQ QUESTIONS 1-9: 0
SUM OF ALL RESPONSES TO PHQ9 QUESTIONS 1 & 2: 0
SUM OF ALL RESPONSES TO PHQ QUESTIONS 1-9: 0
SUM OF ALL RESPONSES TO PHQ QUESTIONS 1-9: 0
2. FEELING DOWN, DEPRESSED OR HOPELESS: NOT AT ALL
SUM OF ALL RESPONSES TO PHQ QUESTIONS 1-9: 0
1. LITTLE INTEREST OR PLEASURE IN DOING THINGS: 0
2. FEELING DOWN, DEPRESSED OR HOPELESS: 0

## 2023-07-03 ENCOUNTER — OFFICE VISIT (OUTPATIENT)
Dept: FAMILY MEDICINE CLINIC | Age: 48
End: 2023-07-03
Payer: COMMERCIAL

## 2023-07-03 VITALS
SYSTOLIC BLOOD PRESSURE: 120 MMHG | TEMPERATURE: 97.8 F | HEIGHT: 68 IN | RESPIRATION RATE: 17 BRPM | OXYGEN SATURATION: 98 % | BODY MASS INDEX: 35.46 KG/M2 | WEIGHT: 234 LBS | DIASTOLIC BLOOD PRESSURE: 82 MMHG | HEART RATE: 69 BPM

## 2023-07-03 DIAGNOSIS — C62.90 MALIGNANT NEOPLASM OF TESTICLE, UNSPECIFIED LATERALITY, UNSPECIFIED WHETHER DESCENDED OR UNDESCENDED (HCC): ICD-10-CM

## 2023-07-03 DIAGNOSIS — E66.01 CLASS 2 SEVERE OBESITY DUE TO EXCESS CALORIES WITH SERIOUS COMORBIDITY AND BODY MASS INDEX (BMI) OF 35.0 TO 35.9 IN ADULT (HCC): ICD-10-CM

## 2023-07-03 DIAGNOSIS — I10 PRIMARY HYPERTENSION: ICD-10-CM

## 2023-07-03 DIAGNOSIS — I10 PRIMARY HYPERTENSION: Primary | ICD-10-CM

## 2023-07-03 DIAGNOSIS — E78.2 MIXED HYPERLIPIDEMIA: Primary | ICD-10-CM

## 2023-07-03 LAB
ALBUMIN SERPL-MCNC: 4.4 G/DL (ref 3.5–5.2)
ALP SERPL-CCNC: 64 U/L (ref 40–129)
ALT SERPL-CCNC: 26 U/L (ref 0–40)
ANION GAP SERPL CALCULATED.3IONS-SCNC: 12 MMOL/L (ref 7–16)
AST SERPL-CCNC: 25 U/L (ref 0–39)
BILIRUB SERPL-MCNC: 0.3 MG/DL (ref 0–1.2)
BILIRUB UR QL STRIP: NEGATIVE
BUN SERPL-MCNC: 16 MG/DL (ref 6–20)
CALCIUM SERPL-MCNC: 9.7 MG/DL (ref 8.6–10.2)
CHLORIDE SERPL-SCNC: 102 MMOL/L (ref 98–107)
CHOLESTEROL, TOTAL: 193 MG/DL (ref 0–199)
CLARITY UR: CLEAR
CO2 SERPL-SCNC: 22 MMOL/L (ref 22–29)
COLOR UR: YELLOW
CREAT SERPL-MCNC: 1 MG/DL (ref 0.7–1.2)
ERYTHROCYTE [DISTWIDTH] IN BLOOD BY AUTOMATED COUNT: 12.8 FL (ref 11.5–15)
GLUCOSE FASTING: 86 MG/DL (ref 74–99)
GLUCOSE UR STRIP-MCNC: NEGATIVE MG/DL
HCT VFR BLD AUTO: 48 % (ref 37–54)
HDLC SERPL-MCNC: 49 MG/DL
HGB BLD-MCNC: 16.3 G/DL (ref 12.5–16.5)
HGB UR QL STRIP: NEGATIVE
KETONES UR STRIP-MCNC: NEGATIVE MG/DL
LDLC SERPL CALC-MCNC: 127 MG/DL (ref 0–99)
LEUKOCYTE ESTERASE UR QL STRIP: NEGATIVE
MCH RBC QN AUTO: 31.2 PG (ref 26–35)
MCHC RBC AUTO-ENTMCNC: 34 % (ref 32–34.5)
MCV RBC AUTO: 91.8 FL (ref 80–99.9)
NITRITE UR QL STRIP: NEGATIVE
PH UR STRIP: 6 [PH] (ref 5–9)
PLATELET # BLD AUTO: 315 E9/L (ref 130–450)
PMV BLD AUTO: 10.3 FL (ref 7–12)
POTASSIUM SERPL-SCNC: 4.6 MMOL/L (ref 3.5–5)
PROT SERPL-MCNC: 7.1 G/DL (ref 6.4–8.3)
PROT UR STRIP-MCNC: NEGATIVE MG/DL
RBC # BLD AUTO: 5.23 E12/L (ref 3.8–5.8)
SODIUM SERPL-SCNC: 136 MMOL/L (ref 132–146)
SP GR UR STRIP: <=1.005 (ref 1–1.03)
TRIGL SERPL-MCNC: 86 MG/DL (ref 0–149)
TSH SERPL-MCNC: 0.92 UIU/ML (ref 0.27–4.2)
UROBILINOGEN UR STRIP-ACNC: 0.2 E.U./DL
VLDLC SERPL CALC-MCNC: 17 MG/DL
WBC # BLD: 7.1 E9/L (ref 4.5–11.5)

## 2023-07-03 PROCEDURE — 99214 OFFICE O/P EST MOD 30 MIN: CPT | Performed by: FAMILY MEDICINE

## 2023-07-03 PROCEDURE — 3074F SYST BP LT 130 MM HG: CPT | Performed by: FAMILY MEDICINE

## 2023-07-03 PROCEDURE — 81003 URINALYSIS AUTO W/O SCOPE: CPT | Performed by: FAMILY MEDICINE

## 2023-07-03 PROCEDURE — 3079F DIAST BP 80-89 MM HG: CPT | Performed by: FAMILY MEDICINE

## 2023-07-03 SDOH — ECONOMIC STABILITY: FOOD INSECURITY: WITHIN THE PAST 12 MONTHS, YOU WORRIED THAT YOUR FOOD WOULD RUN OUT BEFORE YOU GOT MONEY TO BUY MORE.: NEVER TRUE

## 2023-07-03 SDOH — ECONOMIC STABILITY: FOOD INSECURITY: WITHIN THE PAST 12 MONTHS, THE FOOD YOU BOUGHT JUST DIDN'T LAST AND YOU DIDN'T HAVE MONEY TO GET MORE.: NEVER TRUE

## 2023-07-03 SDOH — ECONOMIC STABILITY: INCOME INSECURITY: HOW HARD IS IT FOR YOU TO PAY FOR THE VERY BASICS LIKE FOOD, HOUSING, MEDICAL CARE, AND HEATING?: NOT HARD AT ALL

## 2023-07-03 SDOH — ECONOMIC STABILITY: HOUSING INSECURITY
IN THE LAST 12 MONTHS, WAS THERE A TIME WHEN YOU DID NOT HAVE A STEADY PLACE TO SLEEP OR SLEPT IN A SHELTER (INCLUDING NOW)?: NO

## 2023-07-03 ASSESSMENT — ENCOUNTER SYMPTOMS
ABDOMINAL PAIN: 0
BLOOD IN STOOL: 0
VOMITING: 0
SHORTNESS OF BREATH: 0
NAUSEA: 0
SORE THROAT: 0
ALLERGIC/IMMUNOLOGIC NEGATIVE: 1
BACK PAIN: 0
CHEST TIGHTNESS: 0
EYE DISCHARGE: 0
SINUS PAIN: 0
EYE PAIN: 0
PHOTOPHOBIA: 0
DIARRHEA: 0
COUGH: 0
TROUBLE SWALLOWING: 0
EYE REDNESS: 0

## 2023-07-06 RX ORDER — ROSUVASTATIN CALCIUM 20 MG/1
20 TABLET, COATED ORAL NIGHTLY
Qty: 30 TABLET | Refills: 3 | Status: SHIPPED | OUTPATIENT
Start: 2023-07-06

## 2023-09-06 ENCOUNTER — TELEPHONE (OUTPATIENT)
Dept: FAMILY MEDICINE CLINIC | Age: 48
End: 2023-09-06

## 2023-09-06 DIAGNOSIS — I10 PRIMARY HYPERTENSION: Primary | ICD-10-CM

## 2023-09-06 NOTE — TELEPHONE ENCOUNTER
JYOTI TOLEDO [facility]      She is calling about the order for the CT scan for this patient. He will need a Bun/Creatinine drawn prior to the CT scan. Please put in that order.

## 2023-12-14 ENCOUNTER — OFFICE VISIT (OUTPATIENT)
Dept: FAMILY MEDICINE CLINIC | Age: 48
End: 2023-12-14
Payer: COMMERCIAL

## 2023-12-14 VITALS
OXYGEN SATURATION: 95 % | TEMPERATURE: 98.9 F | BODY MASS INDEX: 34.4 KG/M2 | WEIGHT: 227 LBS | SYSTOLIC BLOOD PRESSURE: 118 MMHG | HEART RATE: 100 BPM | HEIGHT: 68 IN | DIASTOLIC BLOOD PRESSURE: 76 MMHG

## 2023-12-14 DIAGNOSIS — J40 BRONCHITIS: Primary | ICD-10-CM

## 2023-12-14 DIAGNOSIS — R11.0 NAUSEA: ICD-10-CM

## 2023-12-14 LAB
INFLUENZA A ANTIBODY: NORMAL
INFLUENZA B ANTIBODY: NORMAL
Lab: NORMAL
PERFORMING INSTRUMENT: NORMAL
QC PASS/FAIL: NORMAL
SARS-COV-2, POC: NORMAL

## 2023-12-14 PROCEDURE — 87804 INFLUENZA ASSAY W/OPTIC: CPT | Performed by: PHYSICIAN ASSISTANT

## 2023-12-14 PROCEDURE — 87426 SARSCOV CORONAVIRUS AG IA: CPT | Performed by: PHYSICIAN ASSISTANT

## 2023-12-14 PROCEDURE — 99213 OFFICE O/P EST LOW 20 MIN: CPT | Performed by: PHYSICIAN ASSISTANT

## 2023-12-14 RX ORDER — DEXTROMETHORPHAN HYDROBROMIDE AND PROMETHAZINE HYDROCHLORIDE 15; 6.25 MG/5ML; MG/5ML
5 SYRUP ORAL EVERY 6 HOURS PRN
Qty: 120 ML | Refills: 0 | Status: SHIPPED | OUTPATIENT
Start: 2023-12-14

## 2023-12-14 RX ORDER — ONDANSETRON 4 MG/1
4 TABLET, FILM COATED ORAL EVERY 8 HOURS PRN
Qty: 15 TABLET | Refills: 0 | Status: SHIPPED | OUTPATIENT
Start: 2023-12-14

## 2023-12-14 RX ORDER — AZITHROMYCIN 250 MG/1
TABLET, FILM COATED ORAL
Qty: 6 TABLET | Refills: 0 | Status: SHIPPED | OUTPATIENT
Start: 2023-12-14

## 2023-12-14 NOTE — PROGRESS NOTES
bilaterally. TMs are dull bilaterally without any erythema or perforation. Mouth: Posterior pharynx with mild erythema and clear postnasal drip. No tonsillar hypertrophy or exudate. Neck:  Normal ROM. Supple. No anterior cervical adenopathy noted. Lungs: CTAB without wheezes, rales, or rhonchi. Cough is harsh and dry. Patient with coughing fits on exam.  CV:  Regular rate and rhythm, normal heart sounds, without pathological murmurs, ectopy, gallops, or rubs. Skin:  Normal turgor. Warm, dry, without visible rash. Lymphatic: No lymphangitis or adenopathy noted. Neurological:  Oriented. Motor functions intact. Lab / Imaging Results   (All laboratory and radiology results have been personally reviewed by myself)  Labs:  Results for orders placed or performed in visit on 12/14/23   POCT COVID-19, Antigen   Result Value Ref Range    SARS-COV-2, POC Not-Detected Not Detected    Lot Number [id number]     QC Pass/Fail pass     Performing Instrument BD Veritor    POCT Influenza A/B   Result Value Ref Range    Influenza A Ab neg     Influenza B Ab neg        Imaging: All Radiology results interpreted by Radiologist unless otherwise noted. Assessment / Plan     Impression(s):  Michele Kearney was seen today for congestion, fever, nausea and cough. Diagnoses and all orders for this visit:    Bronchitis  -     POCT COVID-19, Antigen  -     POCT Influenza A/B  -     azithromycin (ZITHROMAX Z-RIP) 250 MG tablet; Take 2 tabs on day one, then 1 tab daily for the next 4 days  -     promethazine-dextromethorphan (PROMETHAZINE-DM) 6.25-15 MG/5ML syrup; Take 5 mLs by mouth every 6 hours as needed for Cough    Nausea  -     ondansetron (ZOFRAN) 4 MG tablet; Take 1 tablet by mouth every 8 hours as needed for Nausea or Vomiting      Disposition:  Disposition: Discharge to home. Rapid influenza and COVID-19 tests are both negative in office today. Symptoms are consistent with developing bronchitis. Prescriptions as above.

## 2024-01-18 DIAGNOSIS — I10 PRIMARY HYPERTENSION: ICD-10-CM

## 2024-01-18 LAB
BUN BLDV-MCNC: 17 MG/DL (ref 6–20)
CREAT SERPL-MCNC: 1 MG/DL (ref 0.7–1.2)
GFR SERPL CREATININE-BSD FRML MDRD: >60 ML/MIN/1.73M2

## 2024-03-21 SDOH — HEALTH STABILITY: PHYSICAL HEALTH: ON AVERAGE, HOW MANY MINUTES DO YOU ENGAGE IN EXERCISE AT THIS LEVEL?: 20 MIN

## 2024-03-21 SDOH — HEALTH STABILITY: PHYSICAL HEALTH: ON AVERAGE, HOW MANY DAYS PER WEEK DO YOU ENGAGE IN MODERATE TO STRENUOUS EXERCISE (LIKE A BRISK WALK)?: 3 DAYS

## 2024-03-22 ENCOUNTER — OFFICE VISIT (OUTPATIENT)
Dept: FAMILY MEDICINE CLINIC | Age: 49
End: 2024-03-22
Payer: COMMERCIAL

## 2024-03-22 VITALS
HEART RATE: 62 BPM | WEIGHT: 228 LBS | BODY MASS INDEX: 34.56 KG/M2 | DIASTOLIC BLOOD PRESSURE: 92 MMHG | SYSTOLIC BLOOD PRESSURE: 138 MMHG | TEMPERATURE: 97.9 F | HEIGHT: 68 IN | OXYGEN SATURATION: 96 %

## 2024-03-22 DIAGNOSIS — I10 PRIMARY HYPERTENSION: ICD-10-CM

## 2024-03-22 DIAGNOSIS — M79.674 PAIN OF RIGHT GREAT TOE: ICD-10-CM

## 2024-03-22 DIAGNOSIS — R59.9 PALPABLE LYMPH NODE: ICD-10-CM

## 2024-03-22 DIAGNOSIS — E78.5 HYPERLIPIDEMIA, UNSPECIFIED HYPERLIPIDEMIA TYPE: ICD-10-CM

## 2024-03-22 DIAGNOSIS — C62.90 MALIGNANT NEOPLASM OF TESTICLE, UNSPECIFIED LATERALITY, UNSPECIFIED WHETHER DESCENDED OR UNDESCENDED (HCC): ICD-10-CM

## 2024-03-22 DIAGNOSIS — Z00.00 WELL ADULT EXAM: Primary | ICD-10-CM

## 2024-03-22 LAB
ABSOLUTE IMMATURE GRANULOCYTE: <0.03 K/UL (ref 0–0.58)
ALBUMIN SERPL-MCNC: 4.8 G/DL (ref 3.5–5.2)
ALP BLD-CCNC: 95 U/L (ref 40–129)
ALT SERPL-CCNC: 34 U/L (ref 0–40)
ANION GAP SERPL CALCULATED.3IONS-SCNC: 20 MMOL/L (ref 7–16)
AST SERPL-CCNC: 26 U/L (ref 0–39)
BASOPHILS ABSOLUTE: 0.05 K/UL (ref 0–0.2)
BASOPHILS RELATIVE PERCENT: 1 % (ref 0–2)
BILIRUB SERPL-MCNC: 0.7 MG/DL (ref 0–1.2)
BUN BLDV-MCNC: 12 MG/DL (ref 6–20)
CALCIUM SERPL-MCNC: 9.8 MG/DL (ref 8.6–10.2)
CHLORIDE BLD-SCNC: 104 MMOL/L (ref 98–107)
CO2: 19 MMOL/L (ref 22–29)
CREAT SERPL-MCNC: 1.1 MG/DL (ref 0.7–1.2)
EOSINOPHILS ABSOLUTE: 0.18 K/UL (ref 0.05–0.5)
EOSINOPHILS RELATIVE PERCENT: 4 % (ref 0–6)
GFR SERPL CREATININE-BSD FRML MDRD: >60 ML/MIN/1.73M2
GLUCOSE BLD-MCNC: 94 MG/DL (ref 74–99)
HCT VFR BLD CALC: 47.1 % (ref 37–54)
HEMOGLOBIN: 16.2 G/DL (ref 12.5–16.5)
IMMATURE GRANULOCYTES: 0 % (ref 0–5)
LACTATE DEHYDROGENASE: 179 U/L (ref 135–225)
LYMPHOCYTES ABSOLUTE: 1.54 K/UL (ref 1.5–4)
LYMPHOCYTES RELATIVE PERCENT: 34 % (ref 20–42)
MCH RBC QN AUTO: 30.8 PG (ref 26–35)
MCHC RBC AUTO-ENTMCNC: 34.4 G/DL (ref 32–34.5)
MCV RBC AUTO: 89.5 FL (ref 80–99.9)
MONOCYTES ABSOLUTE: 0.58 K/UL (ref 0.1–0.95)
MONOCYTES RELATIVE PERCENT: 13 % (ref 2–12)
NEUTROPHILS ABSOLUTE: 2.13 K/UL (ref 1.8–7.3)
NEUTROPHILS RELATIVE PERCENT: 48 % (ref 43–80)
PDW BLD-RTO: 12.5 % (ref 11.5–15)
PLATELET # BLD: 311 K/UL (ref 130–450)
PMV BLD AUTO: 10.3 FL (ref 7–12)
POTASSIUM SERPL-SCNC: 4.7 MMOL/L (ref 3.5–5)
PROSTATE SPECIFIC ANTIGEN: 0.97 NG/ML (ref 0–4)
RBC # BLD: 5.26 M/UL (ref 3.8–5.8)
SODIUM BLD-SCNC: 143 MMOL/L (ref 132–146)
TOTAL PROTEIN: 7.8 G/DL (ref 6.4–8.3)
URIC ACID: 8.2 MG/DL (ref 3.4–7)
WBC # BLD: 4.5 K/UL (ref 4.5–11.5)

## 2024-03-22 PROCEDURE — 3075F SYST BP GE 130 - 139MM HG: CPT | Performed by: STUDENT IN AN ORGANIZED HEALTH CARE EDUCATION/TRAINING PROGRAM

## 2024-03-22 PROCEDURE — 99396 PREV VISIT EST AGE 40-64: CPT | Performed by: STUDENT IN AN ORGANIZED HEALTH CARE EDUCATION/TRAINING PROGRAM

## 2024-03-22 PROCEDURE — 3080F DIAST BP >= 90 MM HG: CPT | Performed by: STUDENT IN AN ORGANIZED HEALTH CARE EDUCATION/TRAINING PROGRAM

## 2024-03-22 RX ORDER — LOSARTAN POTASSIUM 25 MG/1
25 TABLET ORAL DAILY
Qty: 30 TABLET | Refills: 1 | Status: SHIPPED | OUTPATIENT
Start: 2024-03-22

## 2024-03-22 ASSESSMENT — ENCOUNTER SYMPTOMS
RHINORRHEA: 0
SHORTNESS OF BREATH: 0
COUGH: 0
NAUSEA: 0

## 2024-03-22 ASSESSMENT — PATIENT HEALTH QUESTIONNAIRE - PHQ9
1. LITTLE INTEREST OR PLEASURE IN DOING THINGS: NOT AT ALL
SUM OF ALL RESPONSES TO PHQ QUESTIONS 1-9: 0
2. FEELING DOWN, DEPRESSED OR HOPELESS: NOT AT ALL
SUM OF ALL RESPONSES TO PHQ9 QUESTIONS 1 & 2: 0
SUM OF ALL RESPONSES TO PHQ QUESTIONS 1-9: 0

## 2024-03-22 NOTE — PROGRESS NOTES
[facility]  Office Note  Dr. Jonathan Malone      Patient:  Aldo Avaloskopkosta 48 y.o. male     Date of Service: 3/22/24      Chief complaint:   Chief Complaint   Patient presents with    Establish Care         History of Present Illness   The patient is a 48 y.o. male  presented to the clinic with complaints as above.        HTN and HLD  -has been on crestor and BP medication in the past  The 10-year ASCVD risk score (Kavita BARAKAT, et al., 2019) is: 2.9%    Values used to calculate the score:      Age: 48 years      Sex: Male      Is Non- : No      Diabetic: No      Tobacco smoker: No      Systolic Blood Pressure: 138 mmHg      Is BP treated: Yes      HDL Cholesterol: 49 mg/dL      Total Cholesterol: 193 mg/dL  BP Readings from Last 3 Encounters:   03/22/24 (!) 138/92   12/14/23 118/76   07/03/23 120/82   Denies chest pain, shortness of breath, leg swelling, headache, vision changes and orthopnea    Wt Readings from Last 3 Encounters:   03/22/24 103.4 kg (228 lb)   12/14/23 103 kg (227 lb)   07/03/23 106.1 kg (234 lb)       Testicular cancer s/p orchiectomy chemo and radiation approximately 2008. Follows with Dr. Hernandez    He feels a mass above his L clavicle. Has noticed it for about a month. It is tender    He has R great toe pain. Occasionally gets swollen, red    Past Medical History:      Diagnosis Date    Cancer (HCC)     Testicular       PastSurgical History:        Procedure Laterality Date    APPENDECTOMY  2008    Right testicle removed    HERNIA REPAIR         Allergies:    Patient has no known allergies.    Social History:   Social History     Socioeconomic History    Marital status:      Spouse name: Not on file    Number of children: Not on file    Years of education: Not on file    Highest education level: Not on file   Occupational History    Not on file   Tobacco Use    Smoking status: Never    Smokeless tobacco: Never   Vaping Use    Vaping Use: Never

## 2024-03-23 ENCOUNTER — PATIENT MESSAGE (OUTPATIENT)
Dept: FAMILY MEDICINE CLINIC | Age: 49
End: 2024-03-23

## 2024-03-23 DIAGNOSIS — R68.82 LOW LIBIDO: Primary | ICD-10-CM

## 2024-03-23 DIAGNOSIS — R53.83 OTHER FATIGUE: ICD-10-CM

## 2024-03-23 DIAGNOSIS — E78.5 HYPERLIPIDEMIA, UNSPECIFIED HYPERLIPIDEMIA TYPE: ICD-10-CM

## 2024-03-24 LAB — BETA HCG TUMOR MARKER: <1 IU/L (ref 0–3)

## 2024-03-26 LAB — AFP: 2.4 UG/L

## 2024-03-28 DIAGNOSIS — E78.5 HYPERLIPIDEMIA, UNSPECIFIED HYPERLIPIDEMIA TYPE: ICD-10-CM

## 2024-03-28 DIAGNOSIS — R68.82 LOW LIBIDO: ICD-10-CM

## 2024-03-28 DIAGNOSIS — R53.83 OTHER FATIGUE: ICD-10-CM

## 2024-03-28 LAB
CHOLESTEROL: 180 MG/DL
HDLC SERPL-MCNC: 43 MG/DL
LDL CHOLESTEROL: 81 MG/DL
TRIGL SERPL-MCNC: 279 MG/DL
VLDLC SERPL CALC-MCNC: 56 MG/DL

## 2024-03-30 LAB
SEX HORMONE BINDING GLOBULIN: 32 NMOL/L (ref 17–56)
TESTOSTERONE FREE-NONMALE: 83.3 PG/ML (ref 47–244)
TESTOSTERONE TOTAL: 402 NG/DL (ref 249–836)
TESTOSTERONE, BIOAVAILABLE: 195.2 NG/DL (ref 130–680)

## 2024-04-04 ENCOUNTER — HOSPITAL ENCOUNTER (OUTPATIENT)
Dept: ULTRASOUND IMAGING | Age: 49
Discharge: HOME | End: 2024-04-06
Attending: STUDENT IN AN ORGANIZED HEALTH CARE EDUCATION/TRAINING PROGRAM
Payer: COMMERCIAL

## 2024-04-04 DIAGNOSIS — R59.9 PALPABLE LYMPH NODE: ICD-10-CM

## 2024-04-04 PROCEDURE — 76999 ECHO EXAMINATION PROCEDURE: CPT

## 2024-04-09 ENCOUNTER — PATIENT MESSAGE (OUTPATIENT)
Dept: FAMILY MEDICINE CLINIC | Age: 49
End: 2024-04-09

## 2024-04-09 DIAGNOSIS — R59.1 LYMPHADENOPATHY: ICD-10-CM

## 2024-04-09 DIAGNOSIS — Z85.47 HISTORY OF TESTICULAR CANCER: Primary | ICD-10-CM

## 2024-04-09 NOTE — TELEPHONE ENCOUNTER
From: Aldo Romo  To: Dr. Jonathan Malone  Sent: 4/9/2024 1:09 PM EDT  Subject: Aldo Romo [employer]    I have not kept in contact with the oncologist. Not sure who you recommend? Also should I be worried about the monocytes being high? When should i set up another appointment with you? Let me know. Thank you.    Aldo Romo   [phone]

## 2024-07-03 ENCOUNTER — OFFICE VISIT (OUTPATIENT)
Dept: FAMILY MEDICINE CLINIC | Age: 49
End: 2024-07-03
Payer: COMMERCIAL

## 2024-07-03 VITALS
TEMPERATURE: 97.9 F | HEIGHT: 68 IN | DIASTOLIC BLOOD PRESSURE: 82 MMHG | SYSTOLIC BLOOD PRESSURE: 124 MMHG | BODY MASS INDEX: 34.25 KG/M2 | OXYGEN SATURATION: 98 % | RESPIRATION RATE: 20 BRPM | HEART RATE: 69 BPM | WEIGHT: 226 LBS

## 2024-07-03 DIAGNOSIS — G25.2 INTENTION TREMOR: ICD-10-CM

## 2024-07-03 DIAGNOSIS — Z85.47 HISTORY OF TESTICULAR CANCER: ICD-10-CM

## 2024-07-03 DIAGNOSIS — I10 PRIMARY HYPERTENSION: ICD-10-CM

## 2024-07-03 DIAGNOSIS — I10 PRIMARY HYPERTENSION: Primary | ICD-10-CM

## 2024-07-03 LAB
ALBUMIN: 4.4 G/DL (ref 3.5–5.2)
ALP BLD-CCNC: 90 U/L (ref 40–129)
ALT SERPL-CCNC: 29 U/L (ref 0–40)
ANION GAP SERPL CALCULATED.3IONS-SCNC: 13 MMOL/L (ref 7–16)
AST SERPL-CCNC: 24 U/L (ref 0–39)
BILIRUB SERPL-MCNC: 0.5 MG/DL (ref 0–1.2)
BUN BLDV-MCNC: 17 MG/DL (ref 6–20)
CALCIUM SERPL-MCNC: 9.8 MG/DL (ref 8.6–10.2)
CHLORIDE BLD-SCNC: 101 MMOL/L (ref 98–107)
CO2: 20 MMOL/L (ref 22–29)
CREAT SERPL-MCNC: 0.9 MG/DL (ref 0.7–1.2)
GFR, ESTIMATED: >90 ML/MIN/1.73M2
GLUCOSE BLD-MCNC: 90 MG/DL (ref 74–99)
LACTATE DEHYDROGENASE: 302 U/L (ref 135–225)
POTASSIUM SERPL-SCNC: 5.2 MMOL/L (ref 3.5–5)
SODIUM BLD-SCNC: 134 MMOL/L (ref 132–146)
TOTAL PROTEIN: 7.5 G/DL (ref 6.4–8.3)

## 2024-07-03 PROCEDURE — G2211 COMPLEX E/M VISIT ADD ON: HCPCS | Performed by: STUDENT IN AN ORGANIZED HEALTH CARE EDUCATION/TRAINING PROGRAM

## 2024-07-03 PROCEDURE — 3074F SYST BP LT 130 MM HG: CPT | Performed by: STUDENT IN AN ORGANIZED HEALTH CARE EDUCATION/TRAINING PROGRAM

## 2024-07-03 PROCEDURE — 99214 OFFICE O/P EST MOD 30 MIN: CPT | Performed by: STUDENT IN AN ORGANIZED HEALTH CARE EDUCATION/TRAINING PROGRAM

## 2024-07-03 PROCEDURE — 3079F DIAST BP 80-89 MM HG: CPT | Performed by: STUDENT IN AN ORGANIZED HEALTH CARE EDUCATION/TRAINING PROGRAM

## 2024-07-03 RX ORDER — LOSARTAN POTASSIUM 25 MG/1
25 TABLET ORAL DAILY
Qty: 90 TABLET | Refills: 1 | Status: SHIPPED | OUTPATIENT
Start: 2024-07-03

## 2024-07-03 SDOH — ECONOMIC STABILITY: INCOME INSECURITY: HOW HARD IS IT FOR YOU TO PAY FOR THE VERY BASICS LIKE FOOD, HOUSING, MEDICAL CARE, AND HEATING?: NOT HARD AT ALL

## 2024-07-03 SDOH — ECONOMIC STABILITY: FOOD INSECURITY: WITHIN THE PAST 12 MONTHS, THE FOOD YOU BOUGHT JUST DIDN'T LAST AND YOU DIDN'T HAVE MONEY TO GET MORE.: NEVER TRUE

## 2024-07-03 SDOH — ECONOMIC STABILITY: FOOD INSECURITY: WITHIN THE PAST 12 MONTHS, YOU WORRIED THAT YOUR FOOD WOULD RUN OUT BEFORE YOU GOT MONEY TO BUY MORE.: NEVER TRUE

## 2024-07-03 ASSESSMENT — ENCOUNTER SYMPTOMS
COUGH: 0
ABDOMINAL PAIN: 0
SHORTNESS OF BREATH: 0
RHINORRHEA: 0
VOMITING: 0
NAUSEA: 0

## 2024-07-03 NOTE — PROGRESS NOTES
[facility]  Office Progress Note  Dr. Jonathan aMlone      Patient:  Aldo Avaloskopkosta 49 y.o. male     Date of Service: 7/3/24      Chief complaint:   Chief Complaint   Patient presents with    3 Month Follow-Up    Hypertension         History of Present Illness   The patient is a 49 y.o. male  here to follow up of their hypertension     HTN  BP Readings from Last 3 Encounters:   07/03/24 124/82   03/22/24 (!) 138/92   12/14/23 118/76     Denies chest pain, shortness of breath, leg swelling, headache, vision changes and orthopnea  On losartan    The 10-year ASCVD risk score (Kavita BARAKAT, et al., 2019) is: 2.8%    Values used to calculate the score:      Age: 49 years      Sex: Male      Is Non- : No      Diabetic: No      Tobacco smoker: No      Systolic Blood Pressure: 124 mmHg      Is BP treated: Yes      HDL Cholesterol: 43 mg/dL      Total Cholesterol: 180 mg/dL      Hx of testicular cancer. Also has a Palpable ln over his R clavicle. Seeing oncology in about a week. He would like to repeat some tumor markers in the mean time    FINDINGS:  Present in the left clavicular region anteriorly is a 0.7 cm oval hypoechoic  area consistent with lymph node versus lipoma.  No other abnormalities.     IMPRESSION:  0.7 cm lymph node versus lipoma in the left clavicular region.     RECOMMENDATION:  Clinical follow-up.      Temor-notices it with eating, writing, typing. Not usually noticing it with rest. Does not intervere with daily acitivity. No walking issues or limb rigidity.    Past Medical History:      Diagnosis Date    Cancer (HCC)     Testicular       Review of Systems:   Review of Systems   Constitutional:  Negative for chills and fever.   HENT:  Negative for congestion and rhinorrhea.    Respiratory:  Negative for cough and shortness of breath.    Cardiovascular:  Negative for chest pain and leg swelling.   Gastrointestinal:  Negative for abdominal pain, nausea and vomiting.

## 2024-07-06 LAB
AFP: 2.1 UG/L
BETA HCG TUMOR MARKER: <1 IU/L (ref 0–3)

## 2024-07-11 ENCOUNTER — HOSPITAL ENCOUNTER (OUTPATIENT)
Dept: INFUSION THERAPY | Age: 49
Discharge: HOME | End: 2024-07-11

## 2024-07-11 ENCOUNTER — OFFICE VISIT (OUTPATIENT)
Dept: ONCOLOGY | Age: 49
End: 2024-07-11
Payer: COMMERCIAL

## 2024-07-11 ENCOUNTER — TELEPHONE (OUTPATIENT)
Dept: CASE MANAGEMENT | Age: 49
End: 2024-07-11

## 2024-07-11 ENCOUNTER — HOSPITAL ENCOUNTER (OUTPATIENT)
Age: 49
Discharge: HOME | End: 2024-07-11
Payer: COMMERCIAL

## 2024-07-11 VITALS
HEART RATE: 66 BPM | WEIGHT: 229.6 LBS | TEMPERATURE: 97.9 F | HEIGHT: 68 IN | BODY MASS INDEX: 34.8 KG/M2 | DIASTOLIC BLOOD PRESSURE: 89 MMHG | SYSTOLIC BLOOD PRESSURE: 136 MMHG | OXYGEN SATURATION: 100 %

## 2024-07-11 DIAGNOSIS — C62.90 MALIGNANT NEOPLASM OF TESTICLE, UNSPECIFIED LATERALITY, UNSPECIFIED WHETHER DESCENDED OR UNDESCENDED (HCC): Primary | ICD-10-CM

## 2024-07-11 DIAGNOSIS — Z85.47 HX OF TESTICULAR CANCER: ICD-10-CM

## 2024-07-11 LAB
ALBUMIN SERPL-MCNC: 4.5 G/DL (ref 3.5–5.2)
ALP SERPL-CCNC: 96 U/L (ref 40–129)
ALT SERPL-CCNC: 35 U/L (ref 0–40)
ANION GAP SERPL CALCULATED.3IONS-SCNC: 11 MMOL/L (ref 7–16)
AST SERPL-CCNC: 25 U/L (ref 0–39)
BASOPHILS # BLD: 0.05 K/UL (ref 0–0.2)
BASOPHILS NFR BLD: 1 % (ref 0–2)
BILIRUB SERPL-MCNC: 1 MG/DL (ref 0–1.2)
BUN SERPL-MCNC: 16 MG/DL (ref 6–20)
CALCIUM SERPL-MCNC: 9.2 MG/DL (ref 8.6–10.2)
CHLORIDE SERPL-SCNC: 101 MMOL/L (ref 98–107)
CO2 SERPL-SCNC: 25 MMOL/L (ref 22–29)
CREAT SERPL-MCNC: 1 MG/DL (ref 0.7–1.2)
EOSINOPHIL # BLD: 0.15 K/UL (ref 0.05–0.5)
EOSINOPHILS RELATIVE PERCENT: 3 % (ref 0–6)
ERYTHROCYTE [DISTWIDTH] IN BLOOD BY AUTOMATED COUNT: 12.1 % (ref 11.5–15)
GFR, ESTIMATED: >90 ML/MIN/1.73M2
GLUCOSE SERPL-MCNC: 94 MG/DL (ref 74–99)
HCT VFR BLD AUTO: 46.1 % (ref 37–54)
HGB BLD-MCNC: 16.1 G/DL (ref 12.5–16.5)
IMM GRANULOCYTES # BLD AUTO: <0.03 K/UL (ref 0–0.58)
IMM GRANULOCYTES NFR BLD: 0 % (ref 0–5)
LDH SERPL-CCNC: 193 U/L (ref 135–225)
LYMPHOCYTES NFR BLD: 1.54 K/UL (ref 1.5–4)
LYMPHOCYTES RELATIVE PERCENT: 34 % (ref 20–42)
MCH RBC QN AUTO: 31.1 PG (ref 26–35)
MCHC RBC AUTO-ENTMCNC: 34.9 G/DL (ref 32–34.5)
MCV RBC AUTO: 89 FL (ref 80–99.9)
MONOCYTES NFR BLD: 0.57 K/UL (ref 0.1–0.95)
MONOCYTES NFR BLD: 13 % (ref 2–12)
NEUTROPHILS NFR BLD: 48 % (ref 43–80)
NEUTS SEG NFR BLD: 2.17 K/UL (ref 1.8–7.3)
PLATELET # BLD AUTO: 290 K/UL (ref 130–450)
PMV BLD AUTO: 9.9 FL (ref 7–12)
POTASSIUM SERPL-SCNC: 4.5 MMOL/L (ref 3.5–5)
PROT SERPL-MCNC: 7.7 G/DL (ref 6.4–8.3)
RBC # BLD AUTO: 5.18 M/UL (ref 3.8–5.8)
SODIUM SERPL-SCNC: 137 MMOL/L (ref 132–146)
WBC OTHER # BLD: 4.5 K/UL (ref 4.5–11.5)

## 2024-07-11 PROCEDURE — 36415 COLL VENOUS BLD VENIPUNCTURE: CPT

## 2024-07-11 PROCEDURE — 80053 COMPREHEN METABOLIC PANEL: CPT

## 2024-07-11 PROCEDURE — 85025 COMPLETE CBC W/AUTO DIFF WBC: CPT

## 2024-07-11 PROCEDURE — 82105 ALPHA-FETOPROTEIN SERUM: CPT

## 2024-07-11 PROCEDURE — 99204 OFFICE O/P NEW MOD 45 MIN: CPT | Performed by: INTERNAL MEDICINE

## 2024-07-11 PROCEDURE — 83615 LACTATE (LD) (LDH) ENZYME: CPT

## 2024-07-11 PROCEDURE — 99214 OFFICE O/P EST MOD 30 MIN: CPT

## 2024-07-11 PROCEDURE — 84702 CHORIONIC GONADOTROPIN TEST: CPT

## 2024-07-11 NOTE — PROGRESS NOTES
1st B/P 146/97  
RESECTION MARGIN ARE  NEGATIVE FOR TUMOR.  -THE BACKGROUND TESTICLE DEMONSTRATES EXTENSIVE  INTRATUBULAR GERM CELL NEOPLASIA.   -SEE COMMENT.    /audrad  6/22/2009      CONVERTED DIAGNOSIS COMMENT Laterality: Right  Focality:  Unifocal  Tumor Size: 1.5 x 1 x 1    Histologic Type: Seminoma, classic type  Margins Spermatic Cord Margin:  Uninvolved by tumor  Other Margins: Uninvolved by tumor   Direct Extension  of Invasive Tumor: None  Venous/Lymphatic (Large/Small Vessel) Invasion  (V/L):  Absent  Additional Pathologic Findings:  Intratubular germ cell  neoplasia   Serum Tumor Markers:  Unknown  Pathologic Staging (pTNM): pT1  NX MX     CONVERTED SPECIMENS 9 SLIDES ([id number]: A-I)   CONVERTED COMPLETE REPORT    Specimen #: [id number]* Submitting Physician: ANTONIO WINCHESTER  ([id number])    __________________________________________________________________________  FINAL DIAGNOSIS  NINE SLIDES RECEIVED ([place], [place], [id number], 7/30/2008):  TESTICLE, RIGHT, ORCHIECTOMY - CLASSIC  SEMINOMA (1.5 CM) WITH AN ASSOCIATED GRANULOMATOUS RESPONSE.   -THE TUMOR  IS CONFINED TO THE TESTICLE.  -THE EPIDIDYMIS, RETE TESTIS, PERITESTICULAR  SOFT TISSUE AND VAS DEFERENS ARE NEGATIVE FOR TUMOR.  -NO ANGIOLYMPHATIC  INVASION IS IDENTIFIED.   -THE SPERMATIC CORD AND SOFT TISSUE RESECTION  MARGIN ARE NEGATIVE FOR TUMOR.  -THE BACKGROUND TESTICLE DEMONSTRATES  EXTENSIVE INTRATUBULAR GERM CELL NEOPLASIA.   -SEE COMMENT.    /astrid  6/22/2009   COMMENT Laterality: Right  Focality:  Unifocal  Tumor Size: 1.5  x 1 x 1   Histologic Type: Seminoma, classic type  Margins Spermatic Cord  Margin:  Uninvolved by tumor  Other Margins: Uninvolved by tumor   Direct  Extension of Invasive Tumor: None  Venous/Lymphatic (Large/Small Vessel)  Invasion (V/L):  Absent  Additional Pathologic Findings:  Intratubular germ  cell neoplasia   Serum Tumor Markers:  Unknown  Pathologic Staging (pTNM):  pT1 NX MX      Irais Alarcon M.D. Ph.D 
assign a risk score. Follow the appropriate action for total score listed below.     Assessment   Date  7/11/2024     Have you lost weight without trying?      0- No     Have you been eating poorly because of a decreased appetite?         0- No   3. Do you have a diagnosis of head and neck cancer OR will you be receiving neoadjuvant treatment for breast cancer?      0- No                                                                                    TOTAL 0        Score of 0-1: No action  Score 2 or greater:  For Non-Diabetic Patient: Recommend adding Ensure Enlive 2 x daily and provide patient with Ensure wellness bag with coupons  For Diabetic Patient: Recommend adding Glucerna Shake 2 x daily and provide patient with Glucerna Wellness bag with coupons  Route to the dietitian via [employer]          OT ASSESSMENT FOR REFERRAL    Are you having  difficulty performing daily routine tasks  due to fatigue or weakness (ie: bathing/showering, dressing, housework, meal prep, work, …): No     Do you have any arm flexibility/ROM restrictions, swelling or pain that limit activity: No     Any changes in memory, attention/focus that impact daily activities: No     Do you avoid participation in leisure/social activity due weakness, fatigue or pain: No     ARE ANY OF THE ABOVE ARE ANSWERED YES: No          PT ASSESSMENT FOR REFERRAL    Have you had any recent falls in past 2 months: No     Do you have difficulty  going up/down stairs: No     Are you having difficulty walking: No     Do you often hold onto furniture/environmental supports or feel off balance when you are walking: No     Do you need to take rest breaks when you are walking: No     Any pain on scale of 1-10 that limits your mobility: No 0/10    ARE ANY OF THE ABOVE ARE ANSWERED YES: No           PREHAB REFERRALS FOR NEOADJUVANT BREAST CANCER PATIENTS    Is this patient a breast cancer patient requiring neoadjuvant chemotherapy: No, this patient does NOT

## 2024-07-11 NOTE — TELEPHONE ENCOUNTER
Met with patient during his initial consultation with Dr. Sal to establish care for his testicular cancer history. Introduced myself and explained my role with patients receiving treatment at our center. Patient was friendly and receptive. Completed nursing assessment for the center including medication review and medical, social, and surgical histories. Patient was previously diagnosed in 2008 with testicular cancer. He underwent concurrent chemo/RT and has been on surveillance through [facility]. Recent tumor markers are normal, these will be repeated in clinic today. CT of the chest/abdomen/pelvis ordered per Dr. Sal's recommendations. Patient to return in 1 month to review results. Discussed additional ancillary services available at the center. Declines any referrals at this time. Provided patient with my contact information, office hours, and encouragement to call me with questions or concerns. Patient verbalizes understanding and appreciative of nurse navigator visit. Emotional support provided and greater than 30 minutes spent with patient. Nurse navigator will continue to follow. Carol Ann Bateman, DONN, RN, Oncology Nurse Navigator

## 2024-07-13 LAB — AFP SERPL-MCNC: 2.3 UG/L

## 2024-07-14 LAB — B-HCG SERPL-ACNC: <1 IU/L (ref 0–3)

## 2024-07-25 ENCOUNTER — HOSPITAL ENCOUNTER (OUTPATIENT)
Dept: CT IMAGING | Age: 49
Discharge: HOME | End: 2024-07-27
Attending: INTERNAL MEDICINE
Payer: COMMERCIAL

## 2024-07-25 DIAGNOSIS — Z85.47 HX OF TESTICULAR CANCER: ICD-10-CM

## 2024-07-25 PROCEDURE — 74177 CT ABD & PELVIS W/CONTRAST: CPT

## 2024-07-25 PROCEDURE — 6360000004 HC RX CONTRAST MEDICATION: Performed by: RADIOLOGY

## 2024-07-25 PROCEDURE — 2580000003 HC RX 258: Performed by: RADIOLOGY

## 2024-07-25 PROCEDURE — 71260 CT THORAX DX C+: CPT

## 2024-07-25 RX ORDER — SODIUM CHLORIDE 0.9 % (FLUSH) 0.9 %
10 SYRINGE (ML) INJECTION PRN
Status: DISCONTINUED | OUTPATIENT
Start: 2024-07-25 | End: 2024-07-28 | Stop reason: HOSPADM

## 2024-07-25 RX ADMIN — SODIUM CHLORIDE, PRESERVATIVE FREE 10 ML: 5 INJECTION INTRAVENOUS at 15:00

## 2024-07-25 RX ADMIN — IOPAMIDOL 75 ML: 755 INJECTION, SOLUTION INTRAVENOUS at 15:00

## 2024-08-08 NOTE — PROGRESS NOTES
[facility] MEDICAL ONCOLOGY  [address] [address]  Dept: [phone]  Loc: [phone]  Outpatient Follow-up Note      Identification: This is a 49 y.o. yo male     Chief Complaint:   Chief Complaint   Patient presents with    Lymphadenopathy    Follow-up       Interval hx :   He is feeling well today without any new complaints.  He does not feel any new lumps.  He still feels this is small nodule around the clavicular area but it is not changing in size and is not painful.  No fevers no chills and no night sweats  He did the CAT scan and he is going to follow-up with his PCP in 1 week    Review of Systems  14 point review of system negative except as mentioned above      Past Medical/Social History:   Past Medical History:   Diagnosis Date    Cancer (HCC)     Testicular     Past Surgical History:   Procedure Laterality Date    APPENDECTOMY  2008    Right testicle removed    HERNIA REPAIR           Current Medications:   Reviewed and reconciled.  Current Outpatient Medications   Medication Sig Dispense Refill    losartan (COZAAR) 25 MG tablet Take 1 tablet by mouth daily 90 tablet 1     No current facility-administered medications for this visit.         Allergies: Reviewed and unchanged.    PHYSICAL EXAM:   VITALS:  BP (!) 130/91   Pulse 65   Temp 97.5 °F (36.4 °C)   Ht 1.702 m (5' 7\")   Wt 104.7 kg (230 lb 12.8 oz)   SpO2 98%   BMI 36.15 kg/m² . Body surface area is 2.22 meters squared.  Physical Exam  Constitutional:       General: He is not in acute distress.     Appearance: He is not ill-appearing.   HENT:      Right Ear: There is no impacted cerumen.      Left Ear: There is no impacted cerumen.      Nose: No congestion or rhinorrhea.      Mouth/Throat:      Pharynx: No oropharyngeal exudate or posterior oropharyngeal erythema.   Eyes:      General:         Right eye: No discharge.         Left eye: No discharge.

## 2024-08-09 ENCOUNTER — OFFICE VISIT (OUTPATIENT)
Dept: ONCOLOGY | Age: 49
End: 2024-08-09

## 2024-08-09 ENCOUNTER — HOSPITAL ENCOUNTER (OUTPATIENT)
Dept: INFUSION THERAPY | Age: 49
Discharge: HOME | End: 2024-08-09

## 2024-08-09 VITALS
HEIGHT: 67 IN | SYSTOLIC BLOOD PRESSURE: 130 MMHG | WEIGHT: 230.8 LBS | OXYGEN SATURATION: 98 % | DIASTOLIC BLOOD PRESSURE: 91 MMHG | BODY MASS INDEX: 36.22 KG/M2 | HEART RATE: 65 BPM | TEMPERATURE: 97.5 F

## 2024-08-09 DIAGNOSIS — Z85.47 HX OF TESTICULAR CANCER: Primary | ICD-10-CM

## 2024-08-16 ENCOUNTER — OFFICE VISIT (OUTPATIENT)
Dept: FAMILY MEDICINE CLINIC | Age: 49
End: 2024-08-16
Payer: COMMERCIAL

## 2024-08-16 VITALS
BODY MASS INDEX: 36.26 KG/M2 | DIASTOLIC BLOOD PRESSURE: 80 MMHG | SYSTOLIC BLOOD PRESSURE: 114 MMHG | HEIGHT: 67 IN | OXYGEN SATURATION: 97 % | WEIGHT: 231 LBS | HEART RATE: 67 BPM | TEMPERATURE: 97.6 F

## 2024-08-16 DIAGNOSIS — N52.9 ERECTILE DYSFUNCTION, UNSPECIFIED ERECTILE DYSFUNCTION TYPE: ICD-10-CM

## 2024-08-16 DIAGNOSIS — R68.82 LOW LIBIDO: Primary | ICD-10-CM

## 2024-08-16 DIAGNOSIS — I10 PRIMARY HYPERTENSION: ICD-10-CM

## 2024-08-16 PROCEDURE — 99214 OFFICE O/P EST MOD 30 MIN: CPT | Performed by: STUDENT IN AN ORGANIZED HEALTH CARE EDUCATION/TRAINING PROGRAM

## 2024-08-16 PROCEDURE — 3074F SYST BP LT 130 MM HG: CPT | Performed by: STUDENT IN AN ORGANIZED HEALTH CARE EDUCATION/TRAINING PROGRAM

## 2024-08-16 PROCEDURE — 3079F DIAST BP 80-89 MM HG: CPT | Performed by: STUDENT IN AN ORGANIZED HEALTH CARE EDUCATION/TRAINING PROGRAM

## 2024-08-16 RX ORDER — TADALAFIL 5 MG/1
5 TABLET ORAL DAILY
Qty: 30 TABLET | Refills: 0 | Status: SHIPPED | OUTPATIENT
Start: 2024-08-16

## 2024-08-16 RX ORDER — LOSARTAN POTASSIUM 25 MG/1
25 TABLET ORAL DAILY
Qty: 90 TABLET | Refills: 1 | Status: SHIPPED | OUTPATIENT
Start: 2024-08-16

## 2024-08-16 ASSESSMENT — ENCOUNTER SYMPTOMS
VOMITING: 0
SHORTNESS OF BREATH: 0
RHINORRHEA: 0
NAUSEA: 0
COUGH: 0
ABDOMINAL PAIN: 0

## 2024-08-16 NOTE — PROGRESS NOTES
[facility]  Office Progress Note  Dr. Jonathan Malone      Patient:  Aldo Avaloskopkosta 49 y.o. male     Date of Service: 8/16/24      Chief complaint:   Chief Complaint   Patient presents with    Hypertension         History of Present Illness   The patient is a 49 y.o. male  here to follow up of their htn    HTN-controlled on cozzar  BP Readings from Last 3 Encounters:   08/16/24 114/80   08/09/24 (!) 130/91   07/11/24 136/89    Denies chest pain, shortness of breath, leg swelling, headache, vision changes and orthopnea    Had A CT to eval hx of testicular ca/lymphadenopathy. Blood work and imaging have bene reassuring. Seeing oncology again in 6 months    ED/low libido. Uncertain if related to hx of testicular ca and removal. Testosterone was wnl.  Past Medical History:      Diagnosis Date    Cancer (HCC)     Testicular       Review of Systems:   Review of Systems   Constitutional:  Negative for chills and fever.   HENT:  Negative for congestion and rhinorrhea.    Respiratory:  Negative for cough and shortness of breath.    Cardiovascular:  Negative for chest pain and leg swelling.   Gastrointestinal:  Negative for abdominal pain, nausea and vomiting.   Genitourinary:  Negative for dysuria and hematuria.   Musculoskeletal:  Negative for arthralgias and myalgias.   Skin:  Negative for rash and wound.   Neurological:  Negative for dizziness and light-headedness.   Hematological:  Positive for adenopathy.       Physical Exam   Vitals: /80   Pulse 67   Temp 97.6 °F (36.4 °C)   Ht 1.702 m (5' 7\")   Wt 104.8 kg (231 lb)   SpO2 97%   BMI 36.18 kg/m²   Physical Exam    General:  Well developed, well nourished, well groomed.  No apparent distress.  HEENT:  Normocephalic, atraumatic.  No scleral icterus.  No conjunctival injection. No nasal discharge. Lymph node in L clavicular region unchanged/maybe slightly smaller  Heart:  RRR, no murmurs, gallops, or rubs  Lungs:  CTA bilaterally, bilat

## 2024-08-26 DIAGNOSIS — I10 PRIMARY HYPERTENSION: ICD-10-CM

## 2024-08-26 DIAGNOSIS — N52.9 ERECTILE DYSFUNCTION, UNSPECIFIED ERECTILE DYSFUNCTION TYPE: ICD-10-CM

## 2024-08-26 DIAGNOSIS — R68.82 LOW LIBIDO: ICD-10-CM

## 2024-08-26 RX ORDER — TADALAFIL 5 MG/1
5 TABLET ORAL DAILY
Qty: 30 TABLET | Refills: 0 | Status: SHIPPED | OUTPATIENT
Start: 2024-08-26

## 2024-08-26 RX ORDER — LOSARTAN POTASSIUM 25 MG/1
25 TABLET ORAL DAILY
Qty: 90 TABLET | Refills: 1 | Status: SHIPPED | OUTPATIENT
Start: 2024-08-26

## 2024-08-26 NOTE — TELEPHONE ENCOUNTER
Last Appointment:  8/16/2024  Future Appointments   Date Time Provider Department Center   2/7/2025 10:00 AM Vaibhav Sal MD [facility]   2/7/2025 10:15 AM [facility] FAST TRACK 1 [facility]

## 2024-10-25 ENCOUNTER — OFFICE VISIT (OUTPATIENT)
Dept: ORTHOPEDIC SURGERY | Age: 49
End: 2024-10-25

## 2024-10-25 VITALS — BODY MASS INDEX: 33.34 KG/M2 | HEIGHT: 68 IN | WEIGHT: 220 LBS

## 2024-10-25 DIAGNOSIS — S46.812A TRAPEZIUS STRAIN, LEFT, INITIAL ENCOUNTER: ICD-10-CM

## 2024-10-25 DIAGNOSIS — M19.012 ARTHROPATHY OF LEFT SHOULDER: ICD-10-CM

## 2024-10-25 DIAGNOSIS — M75.22 BICEPS TENDONITIS ON LEFT: Primary | ICD-10-CM

## 2024-10-25 RX ORDER — PREDNISONE 10 MG/1
TABLET ORAL
Qty: 24 TABLET | Refills: 0 | Status: SHIPPED | OUTPATIENT
Start: 2024-10-25

## 2024-10-25 NOTE — PATIENT INSTRUCTIONS
*At this time I have recommended an oral steroid, Prednisone 10mg 3 tablets once daily by mouth for 4 days, then 2 tablets once daily for 4 days, then 1 tablet once daily for 4 days then STOP. I did discuss potential side effect such as GI upset, mood changes, depression, anxiety, change in sleep habits.  The patient develops any of these signs or symptoms they will call the office immediately and we will discontinue the medication in appropriate fashion.  They are aware that he should not use any other oral anti-inflammatories while on the oral steroids.  They can use Tylenol OTC PRN.    *Patient can use VOLTAREN GEL 1% (DICLOFENAC SODIUM) Apply 2 grams twice daily to the affected shoulder as needed, which can be obtained over the counter.

## 2024-10-25 NOTE — PROGRESS NOTES
[facility]  New Patient Note      CHIEF COMPLAINT:   Chief Complaint   Patient presents with    Shoulder Pain     Shoulder pain x 1 day, no definite YAZ.  Was on a ladder out in the wind on Wednesday, unsure if related.       HISTORY OF PRESENT ILLNESS:                The patient is a 49 y.o. male who presents today with complaints of left shoulder pain x 1 day, on known YAZ.  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  Pt localizes the pain to deep in the joint, anterior.  Pt reports radiating symptoms into lower arm.  Pt denies any numbness, tingling, loss of sensation or radiation of symptoms into fingers.  Pain is worse with rest/not moving the shoulder.  They have tried at home therapies of ibuprofen for symptomatic relief.  Pt has never injured this shoulder in the past.  Pt is right hand dominant.        Past Medical History:        Diagnosis Date    Cancer (HCC)     Testicular     Past Surgical History:        Procedure Laterality Date    APPENDECTOMY  2008    Right testicle removed    HERNIA REPAIR       Current Medications:   No current facility-administered medications for this visit.  Allergies:  Patient has no known allergies.    Social History:   TOBACCO:   reports that he has never smoked. He has never used smokeless tobacco.  ETOH:   reports no history of alcohol use.  DRUGS:   reports no history of drug use.    Review of Systems   Constitutional: Negative for fever, chills, diaphoresis, appetite change and fatigue.   HENT: Negative for dental issues, hearing loss and tinnitus. Negative for congestion, sinus pressure, sneezing, sore throat. Negative for headache.  Eyes: Negative for visual disturbance, blurred and double vision. Negative for pain, discharge, redness and itching  Respiratory: Negative for cough, shortness of breath and wheezing.   Cardiovascular: Negative for chest pain, palpitations and leg swelling. No dyspnea on exertion

## 2024-12-17 ENCOUNTER — TELEPHONE (OUTPATIENT)
Dept: FAMILY MEDICINE CLINIC | Age: 49
End: 2024-12-17

## 2024-12-17 NOTE — TELEPHONE ENCOUNTER
This patient is wanting an appointment anytime between Dec 23rd and January 6th for anxiety building because he got a new job. I don't see anywhere that I can schedule him in that time frame. Any suggestions?

## 2025-01-03 ENCOUNTER — OFFICE VISIT (OUTPATIENT)
Dept: FAMILY MEDICINE CLINIC | Age: 50
End: 2025-01-03

## 2025-01-03 VITALS
WEIGHT: 236 LBS | SYSTOLIC BLOOD PRESSURE: 120 MMHG | BODY MASS INDEX: 35.77 KG/M2 | DIASTOLIC BLOOD PRESSURE: 84 MMHG | HEIGHT: 68 IN | OXYGEN SATURATION: 98 % | TEMPERATURE: 97.8 F | HEART RATE: 82 BPM

## 2025-01-03 DIAGNOSIS — I10 PRIMARY HYPERTENSION: Primary | ICD-10-CM

## 2025-01-03 DIAGNOSIS — R68.82 LOW LIBIDO: ICD-10-CM

## 2025-01-03 DIAGNOSIS — Z76.0 MEDICATION REFILL: ICD-10-CM

## 2025-01-03 DIAGNOSIS — C62.90 MALIGNANT NEOPLASM OF TESTICLE, UNSPECIFIED LATERALITY, UNSPECIFIED WHETHER DESCENDED OR UNDESCENDED (HCC): ICD-10-CM

## 2025-01-03 DIAGNOSIS — G25.0 ESSENTIAL TREMOR: ICD-10-CM

## 2025-01-03 DIAGNOSIS — F41.9 ANXIETY: ICD-10-CM

## 2025-01-03 RX ORDER — TADALAFIL 5 MG/1
5 TABLET ORAL DAILY
Qty: 30 TABLET | Refills: 0 | Status: SHIPPED | OUTPATIENT
Start: 2025-01-03

## 2025-01-03 RX ORDER — BUPROPION HYDROCHLORIDE 150 MG/1
150 TABLET ORAL EVERY MORNING
Qty: 30 TABLET | Refills: 3 | Status: SHIPPED | OUTPATIENT
Start: 2025-01-03

## 2025-01-03 RX ORDER — LOSARTAN POTASSIUM 25 MG/1
25 TABLET ORAL DAILY
Qty: 90 TABLET | Refills: 1 | Status: SHIPPED | OUTPATIENT
Start: 2025-01-03

## 2025-01-03 ASSESSMENT — ENCOUNTER SYMPTOMS
RHINORRHEA: 0
SHORTNESS OF BREATH: 0
NAUSEA: 0
ABDOMINAL PAIN: 0
COUGH: 0
VOMITING: 0

## 2025-01-03 ASSESSMENT — PATIENT HEALTH QUESTIONNAIRE - PHQ9
SUM OF ALL RESPONSES TO PHQ QUESTIONS 1-9: 0
1. LITTLE INTEREST OR PLEASURE IN DOING THINGS: NOT AT ALL
SUM OF ALL RESPONSES TO PHQ9 QUESTIONS 1 & 2: 0
2. FEELING DOWN, DEPRESSED OR HOPELESS: NOT AT ALL
SUM OF ALL RESPONSES TO PHQ QUESTIONS 1-9: 0

## 2025-01-03 NOTE — PROGRESS NOTES
[facility]  Office Progress Note  Dr. Jonathan Malone      Patient:  Aldo Avaloskopkosta 49 y.o. male     Date of Service: 1/3/25      Chief complaint:   Chief Complaint   Patient presents with    Anxiety     Feels like its job related    Shaking     Right hand, has gotten worse, Feels lightheaded at times         History of Present Illness   The patient is a 49 y.o. male  here to follow up of their tremor, anxiety    History of Present Illness    Anxiety-more than baseline. Just started a teaching job. Home life is good but there are stressors. Depression not identified as an issue. No SI/HI    Feels like tremor has worsened. Feels like it is becoming bilateral. Noticing more things at rest. He is worried about parkinsons. No gait issues. Feels like he does have some brain gof type symptoms recently.     HTN  BP Readings from Last 3 Encounters:   01/03/25 120/84   08/16/24 114/80   08/09/24 (!) 130/91   Denies chest pain, shortness of breath, leg swelling, headache, vision changes and orthopnea  On losartan     Hx of testicular cancer. Follows with hematology       Past Medical History:      Diagnosis Date    Cancer (HCC)     Testicular       Review of Systems:   Review of Systems   Constitutional:  Negative for chills and fever.   HENT:  Negative for congestion and rhinorrhea.    Respiratory:  Negative for cough and shortness of breath.    Cardiovascular:  Negative for chest pain and leg swelling.   Gastrointestinal:  Negative for abdominal pain, nausea and vomiting.   Genitourinary:  Negative for dysuria and hematuria.   Musculoskeletal:  Negative for arthralgias and myalgias.   Skin:  Negative for rash and wound.   Neurological:  Positive for tremors. Negative for dizziness and light-headedness.   Psychiatric/Behavioral:  The patient is nervous/anxious.        Physical Exam   Vitals: /84   Pulse 82   Temp 97.8 °F (36.6 °C)   Ht 1.727 m (5' 8\")   Wt 107 kg (236 lb)   SpO2 98%   BMI

## 2025-02-07 ENCOUNTER — HOSPITAL ENCOUNTER (OUTPATIENT)
Dept: INFUSION THERAPY | Age: 50
Discharge: HOME | End: 2025-02-07
Payer: COMMERCIAL

## 2025-02-07 ENCOUNTER — OFFICE VISIT (OUTPATIENT)
Dept: ONCOLOGY | Age: 50
End: 2025-02-07

## 2025-02-07 VITALS
HEART RATE: 81 BPM | TEMPERATURE: 97.8 F | SYSTOLIC BLOOD PRESSURE: 148 MMHG | DIASTOLIC BLOOD PRESSURE: 98 MMHG | HEIGHT: 68 IN | BODY MASS INDEX: 35.77 KG/M2 | WEIGHT: 236 LBS | OXYGEN SATURATION: 98 %

## 2025-02-07 DIAGNOSIS — Z85.47 HX OF TESTICULAR CANCER: Primary | ICD-10-CM

## 2025-02-07 DIAGNOSIS — Z85.47 HX OF TESTICULAR CANCER: ICD-10-CM

## 2025-02-07 DIAGNOSIS — R22.43 LOCALIZED SWELLING OF BOTH LOWER LEGS: ICD-10-CM

## 2025-02-07 LAB
ALBUMIN SERPL-MCNC: 4.5 G/DL (ref 3.5–5.2)
ALP SERPL-CCNC: 102 U/L (ref 40–129)
ALT SERPL-CCNC: 44 U/L (ref 0–40)
ANION GAP SERPL CALCULATED.3IONS-SCNC: 10 MMOL/L (ref 7–16)
AST SERPL-CCNC: 24 U/L (ref 0–39)
BASOPHILS # BLD: 0.06 K/UL (ref 0–0.2)
BASOPHILS NFR BLD: 1 % (ref 0–2)
BILIRUB SERPL-MCNC: 0.7 MG/DL (ref 0–1.2)
BUN SERPL-MCNC: 13 MG/DL (ref 6–20)
CALCIUM SERPL-MCNC: 9.5 MG/DL (ref 8.6–10.2)
CHLORIDE SERPL-SCNC: 101 MMOL/L (ref 98–107)
CO2 SERPL-SCNC: 26 MMOL/L (ref 22–29)
CREAT SERPL-MCNC: 1 MG/DL (ref 0.7–1.2)
EOSINOPHIL # BLD: 0.17 K/UL (ref 0.05–0.5)
EOSINOPHILS RELATIVE PERCENT: 3 % (ref 0–6)
ERYTHROCYTE [DISTWIDTH] IN BLOOD BY AUTOMATED COUNT: 12.1 % (ref 11.5–15)
GFR, ESTIMATED: >90 ML/MIN/1.73M2
GLUCOSE SERPL-MCNC: 95 MG/DL (ref 74–99)
HCT VFR BLD AUTO: 47.5 % (ref 37–54)
HGB BLD-MCNC: 16.4 G/DL (ref 12.5–16.5)
IMM GRANULOCYTES # BLD AUTO: <0.03 K/UL (ref 0–0.58)
IMM GRANULOCYTES NFR BLD: 0 % (ref 0–5)
LDH SERPL-CCNC: 173 U/L (ref 135–225)
LYMPHOCYTES NFR BLD: 1.48 K/UL (ref 1.5–4)
LYMPHOCYTES RELATIVE PERCENT: 29 % (ref 20–42)
MCH RBC QN AUTO: 31.2 PG (ref 26–35)
MCHC RBC AUTO-ENTMCNC: 34.5 G/DL (ref 32–34.5)
MCV RBC AUTO: 90.3 FL (ref 80–99.9)
MONOCYTES NFR BLD: 0.7 K/UL (ref 0.1–0.95)
MONOCYTES NFR BLD: 14 % (ref 2–12)
NEUTROPHILS NFR BLD: 52 % (ref 43–80)
NEUTS SEG NFR BLD: 2.66 K/UL (ref 1.8–7.3)
PLATELET # BLD AUTO: 316 K/UL (ref 130–450)
PMV BLD AUTO: 9.8 FL (ref 7–12)
POTASSIUM SERPL-SCNC: 4.3 MMOL/L (ref 3.5–5)
PROT SERPL-MCNC: 7.8 G/DL (ref 6.4–8.3)
RBC # BLD AUTO: 5.26 M/UL (ref 3.8–5.8)
SODIUM SERPL-SCNC: 137 MMOL/L (ref 132–146)
WBC OTHER # BLD: 5.1 K/UL (ref 4.5–11.5)

## 2025-02-07 PROCEDURE — 83615 LACTATE (LD) (LDH) ENZYME: CPT

## 2025-02-07 PROCEDURE — 85025 COMPLETE CBC W/AUTO DIFF WBC: CPT

## 2025-02-07 PROCEDURE — 36415 COLL VENOUS BLD VENIPUNCTURE: CPT

## 2025-02-07 PROCEDURE — 82105 ALPHA-FETOPROTEIN SERUM: CPT

## 2025-02-07 PROCEDURE — 80053 COMPREHEN METABOLIC PANEL: CPT

## 2025-02-07 PROCEDURE — 84702 CHORIONIC GONADOTROPIN TEST: CPT

## 2025-02-07 NOTE — PROGRESS NOTES
[facility] C[facility] MEDICAL ONCOLOGY  [address] [address] [address]  Dept: [phone]  Loc: [phone]  Outpatient Follow-up Note      Identification: This is a 49 y.o. yo male     Chief Complaint:   Chief Complaint   Patient presents with    HX of testicular cancer       Interval hx :   2/7/2025:  He is feeling well today except intermittently feeling some spasm-like feeling around the left side of the neck, we discussed that in case this persists or recurs he will let us know for checking further imaging.  No fevers, no chills, no night sweats  He does have bilateral mild lower extremity swelling which has been going on for a long time      Review of Systems  14 point review of system negative except as mentioned above      Past Medical/Social History:   Past Medical History:   Diagnosis Date    Cancer (HCC)     Testicular     Past Surgical History:   Procedure Laterality Date    APPENDECTOMY  2008    Right testicle removed    HERNIA REPAIR           Current Medications:   Reviewed and reconciled.  Current Outpatient Medications   Medication Sig Dispense Refill    losartan (COZAAR) 25 MG tablet Take 1 tablet by mouth daily 90 tablet 1    tadalafil (CIALIS) 5 MG tablet Take 1 tablet by mouth daily 30 tablet 0    buPROPion (WELLBUTRIN XL) 150 MG extended release tablet Take 1 tablet by mouth every morning 30 tablet 3     No current facility-administered medications for this visit.         Allergies: Reviewed and unchanged.    PHYSICAL EXAM:   VITALS:  BP (!) 148/98   Pulse 81   Temp 97.8 °F (36.6 °C)   Ht 1.727 m (5' 8\")   Wt 107 kg (236 lb)   SpO2 98%   BMI 35.88 kg/m² . Body surface area is 2.27 meters squared.  Physical Exam  Constitutional:       General: He is not in acute distress.     Appearance: He is not ill-appearing, toxic-appearing or diaphoretic.      Comments: Awake, alert, no acute distress   HENT:      Head: Normocephalic.

## 2025-02-09 LAB — AFP SERPL-MCNC: 2.4 UG/L

## 2025-02-10 LAB — B-HCG SERPL-ACNC: <1 IU/L (ref 0–3)

## 2025-02-13 ENCOUNTER — TELEPHONE (OUTPATIENT)
Dept: CARDIOLOGY CLINIC | Age: 50
End: 2025-02-13

## 2025-02-18 ENCOUNTER — OFFICE VISIT (OUTPATIENT)
Dept: CARDIOLOGY CLINIC | Age: 50
End: 2025-02-18
Payer: COMMERCIAL

## 2025-02-18 VITALS
HEART RATE: 73 BPM | RESPIRATION RATE: 18 BRPM | DIASTOLIC BLOOD PRESSURE: 92 MMHG | HEIGHT: 68 IN | SYSTOLIC BLOOD PRESSURE: 120 MMHG | WEIGHT: 242 LBS | BODY MASS INDEX: 36.68 KG/M2

## 2025-02-18 DIAGNOSIS — R60.9 SWELLING: Primary | ICD-10-CM

## 2025-02-18 PROCEDURE — 93000 ELECTROCARDIOGRAM COMPLETE: CPT | Performed by: INTERNAL MEDICINE

## 2025-02-18 PROCEDURE — 99204 OFFICE O/P NEW MOD 45 MIN: CPT | Performed by: INTERNAL MEDICINE

## 2025-02-18 RX ORDER — FUROSEMIDE 20 MG/1
20 TABLET ORAL DAILY
Qty: 20 TABLET | Refills: 0 | Status: SHIPPED | OUTPATIENT
Start: 2025-02-18

## 2025-02-18 ASSESSMENT — ENCOUNTER SYMPTOMS
VOMITING: 0
CHEST TIGHTNESS: 0
NAUSEA: 0
SHORTNESS OF BREATH: 0
ABDOMINAL PAIN: 0
BLOOD IN STOOL: 0
BACK PAIN: 0
COUGH: 0

## 2025-02-18 NOTE — PROGRESS NOTES
Food Insecurity (7/3/2024)    Hunger Vital Sign     Worried About Running Out of Food in the Last Year: Never true     Ran Out of Food in the Last Year: Never true   Transportation Needs: Unknown (7/3/2024)    PRAPARE - Transportation     Lack of Transportation (Medical): Not on file     Lack of Transportation (Non-Medical): No   Physical Activity: Insufficiently Active (3/21/2024)    Exercise Vital Sign     Days of Exercise per Week: 3 days     Minutes of Exercise per Session: 20 min   Stress: Not on file   Social Connections: Not on file   Intimate Partner Violence: Not on file   Housing Stability: Unknown (7/3/2024)    Housing Stability Vital Sign     Unable to Pay for Housing in the Last Year: Not on file     Number of Places Lived in the Last Year: Not on file     Unstable Housing in the Last Year: No      No Known Allergies        Current Outpatient Medications:     losartan (COZAAR) 25 MG tablet, Take 1 tablet by mouth daily, Disp: 90 tablet, Rfl: 1    tadalafil (CIALIS) 5 MG tablet, Take 1 tablet by mouth daily, Disp: 30 tablet, Rfl: 0    buPROPion (WELLBUTRIN XL) 150 MG extended release tablet, Take 1 tablet by mouth every morning, Disp: 30 tablet, Rfl: 3      REVIEW OF SYSTEMS:   Review of Systems   Constitutional:  Negative for chills, fatigue and fever.   HENT:  Negative for nosebleeds.    Respiratory:  Negative for cough, chest tightness and shortness of breath.    Cardiovascular:  Positive for leg swelling. Negative for chest pain and palpitations.   Gastrointestinal:  Negative for abdominal pain, blood in stool, nausea and vomiting.   Genitourinary:  Negative for hematuria.   Musculoskeletal:  Negative for back pain and myalgias.   Skin: Negative.    Neurological:  Negative for dizziness, syncope, speech difficulty, weakness and light-headedness.   Hematological:  Does not bruise/bleed easily.   Psychiatric/Behavioral: Negative.            PHYSICAL EXAMINATION:   VITAL SIGNS: Resp 18   Ht 1.727 m (5'

## 2025-02-19 ENCOUNTER — TELEPHONE (OUTPATIENT)
Dept: CARDIOLOGY | Age: 50
End: 2025-02-19

## 2025-02-19 NOTE — TELEPHONE ENCOUNTER
CALLED PATIENT 1X AND LEFT MESSAGE TO SCHEDULE ECHO    Electronically signed by Tarsha Castano on 2/19/2025 at 1:35 PM

## 2025-02-21 DIAGNOSIS — R60.9 SWELLING: ICD-10-CM

## 2025-02-21 LAB — NT PRO BNP: <36 PG/ML (ref 0–125)

## 2025-03-12 ENCOUNTER — HOSPITAL ENCOUNTER (OUTPATIENT)
Dept: CARDIOLOGY | Age: 50
Discharge: HOME | End: 2025-03-14
Attending: INTERNAL MEDICINE
Payer: COMMERCIAL

## 2025-03-12 VITALS
BODY MASS INDEX: 36.68 KG/M2 | WEIGHT: 242 LBS | DIASTOLIC BLOOD PRESSURE: 92 MMHG | SYSTOLIC BLOOD PRESSURE: 120 MMHG | HEIGHT: 68 IN

## 2025-03-12 DIAGNOSIS — R60.9 SWELLING: ICD-10-CM

## 2025-03-12 LAB
ECHO AO ASC DIAM: 3.1 CM
ECHO AO ASCENDING AORTA INDEX: 1.4 CM/M2
ECHO AV AREA PEAK VELOCITY: 3.4 CM2
ECHO AV AREA VTI: 3.4 CM2
ECHO AV AREA/BSA PEAK VELOCITY: 1.5 CM2/M2
ECHO AV AREA/BSA VTI: 1.5 CM2/M2
ECHO AV CUSP MM: 2.2 CM
ECHO AV MEAN GRADIENT: 3 MMHG
ECHO AV MEAN VELOCITY: 0.8 M/S
ECHO AV PEAK GRADIENT: 5 MMHG
ECHO AV PEAK VELOCITY: 1.2 M/S
ECHO AV VELOCITY RATIO: 1
ECHO AV VTI: 23.2 CM
ECHO BSA: 2.29 M2
ECHO EST RA PRESSURE: 3 MMHG
ECHO LA DIAMETER INDEX: 1.58 CM/M2
ECHO LA DIAMETER: 3.5 CM
ECHO LA VOL A-L A2C: 52 ML (ref 18–58)
ECHO LA VOL A-L A4C: 40 ML (ref 18–58)
ECHO LA VOL MOD A2C: 49 ML (ref 18–58)
ECHO LA VOL MOD A4C: 37 ML (ref 18–58)
ECHO LA VOLUME AREA LENGTH: 47 ML
ECHO LA VOLUME INDEX A-L A2C: 23 ML/M2 (ref 16–34)
ECHO LA VOLUME INDEX A-L A4C: 18 ML/M2 (ref 16–34)
ECHO LA VOLUME INDEX AREA LENGTH: 21 ML/M2 (ref 16–34)
ECHO LA VOLUME INDEX MOD A2C: 22 ML/M2 (ref 16–34)
ECHO LA VOLUME INDEX MOD A4C: 17 ML/M2 (ref 16–34)
ECHO LV EF PHYSICIAN: 65 %
ECHO LV FRACTIONAL SHORTENING: 32 % (ref 28–44)
ECHO LV INTERNAL DIMENSION DIASTOLE INDEX: 1.98 CM/M2
ECHO LV INTERNAL DIMENSION DIASTOLIC: 4.4 CM (ref 4.2–5.9)
ECHO LV INTERNAL DIMENSION SYSTOLIC INDEX: 1.35 CM/M2
ECHO LV INTERNAL DIMENSION SYSTOLIC: 3 CM
ECHO LV ISOVOLUMETRIC RELAXATION TIME (IVRT): 69.2 MS
ECHO LV IVSD: 1 CM (ref 0.6–1)
ECHO LV IVSS: 1.6 CM
ECHO LV MASS 2D: 137.8 G (ref 88–224)
ECHO LV MASS INDEX 2D: 62.1 G/M2 (ref 49–115)
ECHO LV POSTERIOR WALL DIASTOLIC: 0.9 CM (ref 0.6–1)
ECHO LV POSTERIOR WALL SYSTOLIC: 1.3 CM
ECHO LV RELATIVE WALL THICKNESS RATIO: 0.41
ECHO LVOT AREA: 3.1 CM2
ECHO LVOT AV VTI INDEX: 1.04
ECHO LVOT DIAM: 2 CM
ECHO LVOT MEAN GRADIENT: 3 MMHG
ECHO LVOT PEAK GRADIENT: 6 MMHG
ECHO LVOT PEAK VELOCITY: 1.2 M/S
ECHO LVOT STROKE VOLUME INDEX: 34.1 ML/M2
ECHO LVOT SV: 75.7 ML
ECHO LVOT VTI: 24.1 CM
ECHO MV "A" WAVE DURATION: 96.9 MSEC
ECHO MV A VELOCITY: 0.78 M/S
ECHO MV AREA PHT: 3.9 CM2
ECHO MV AREA VTI: 2.9 CM2
ECHO MV E DECELERATION TIME (DT): 205.2 MS
ECHO MV E VELOCITY: 0.69 M/S
ECHO MV E/A RATIO: 0.88
ECHO MV LVOT VTI INDEX: 1.1
ECHO MV MAX VELOCITY: 0.9 M/S
ECHO MV MEAN GRADIENT: 1 MMHG
ECHO MV MEAN VELOCITY: 0.6 M/S
ECHO MV PEAK GRADIENT: 3 MMHG
ECHO MV PRESSURE HALF TIME (PHT): 55.7 MS
ECHO MV VTI: 26.4 CM
ECHO PULMONARY ARTERY END DIASTOLIC PRESSURE: 2 MMHG
ECHO PV MAX VELOCITY: 0.9 M/S
ECHO PV MEAN GRADIENT: 2 MMHG
ECHO PV MEAN VELOCITY: 0.6 M/S
ECHO PV PEAK GRADIENT: 3 MMHG
ECHO PV REGURGITANT MAX VELOCITY: 0.7 M/S
ECHO PV VTI: 18.8 CM
ECHO PVEIN A DURATION: 92.3 MS
ECHO PVEIN A VELOCITY: 0.2 M/S
ECHO PVEIN PEAK D VELOCITY: 0.5 M/S
ECHO PVEIN PEAK S VELOCITY: 0.6 M/S
ECHO PVEIN S/D RATIO: 1.2
ECHO RIGHT VENTRICULAR SYSTOLIC PRESSURE (RVSP): 20 MMHG
ECHO RV INTERNAL DIMENSION: 3.8 CM
ECHO TV REGURGITANT MAX VELOCITY: 2.05 M/S
ECHO TV REGURGITANT PEAK GRADIENT: 17 MMHG

## 2025-03-12 PROCEDURE — 6360000004 HC RX CONTRAST MEDICATION: Performed by: INTERNAL MEDICINE

## 2025-03-12 PROCEDURE — 2500000003 HC RX 250 WO HCPCS: Performed by: INTERNAL MEDICINE

## 2025-03-12 PROCEDURE — C8929 TTE W OR WO FOL WCON,DOPPLER: HCPCS

## 2025-03-12 RX ORDER — SODIUM CHLORIDE 0.9 % (FLUSH) 0.9 %
10 SYRINGE (ML) INJECTION PRN
Status: DISCONTINUED | OUTPATIENT
Start: 2025-03-12 | End: 2025-03-15 | Stop reason: HOSPADM

## 2025-03-12 RX ADMIN — SODIUM CHLORIDE, PRESERVATIVE FREE 10 ML: 5 INJECTION INTRAVENOUS at 08:56

## 2025-03-12 RX ADMIN — SULFUR HEXAFLUORIDE 2 ML: KIT at 08:54

## 2025-04-06 DIAGNOSIS — F41.9 ANXIETY: ICD-10-CM

## 2025-04-07 RX ORDER — BUPROPION HYDROCHLORIDE 150 MG/1
150 TABLET ORAL EVERY MORNING
Qty: 90 TABLET | Refills: 0 | Status: SHIPPED | OUTPATIENT
Start: 2025-04-07

## 2025-04-07 NOTE — TELEPHONE ENCOUNTER
Name of Medication(s) Requested:  Requested Prescriptions     Pending Prescriptions Disp Refills    buPROPion (WELLBUTRIN XL) 150 MG extended release tablet 90 tablet 1     Sig: Take 1 tablet by mouth every morning       Medication is on current medication list Yes    Dosage and directions were verified? Yes    Quantity verified: 90 day supply     Pharmacy Verified?  Yes    Last Appointment:  1/3/2025    Future appts:  Future Appointments   Date Time Provider Department Center   5/23/2025  1:30 PM Sebastián Olivera MD [facility] CARDIO [facility]   2/6/2026 10:00 AM Vaibhav Sal MD [facility] MED ONC [facility]   2/6/2026 10:30 AM [facility] Med Onc [facility]        (If no appt send self scheduling link. .REFILLAPPT)  Scheduling request sent?     [] Yes  [x] No    Does patient need updated?  [] Yes  [x] No

## 2025-08-04 SDOH — ECONOMIC STABILITY: FOOD INSECURITY: WITHIN THE PAST 12 MONTHS, YOU WORRIED THAT YOUR FOOD WOULD RUN OUT BEFORE YOU GOT MONEY TO BUY MORE.: NEVER TRUE

## 2025-08-04 SDOH — ECONOMIC STABILITY: FOOD INSECURITY: WITHIN THE PAST 12 MONTHS, THE FOOD YOU BOUGHT JUST DIDN'T LAST AND YOU DIDN'T HAVE MONEY TO GET MORE.: NEVER TRUE

## 2025-08-04 SDOH — ECONOMIC STABILITY: INCOME INSECURITY: IN THE LAST 12 MONTHS, WAS THERE A TIME WHEN YOU WERE NOT ABLE TO PAY THE MORTGAGE OR RENT ON TIME?: NO

## 2025-08-04 SDOH — ECONOMIC STABILITY: TRANSPORTATION INSECURITY
IN THE PAST 12 MONTHS, HAS THE LACK OF TRANSPORTATION KEPT YOU FROM MEDICAL APPOINTMENTS OR FROM GETTING MEDICATIONS?: NO

## 2025-08-04 SDOH — ECONOMIC STABILITY: TRANSPORTATION INSECURITY
IN THE PAST 12 MONTHS, HAS LACK OF TRANSPORTATION KEPT YOU FROM MEETINGS, WORK, OR FROM GETTING THINGS NEEDED FOR DAILY LIVING?: NO

## 2025-08-05 ENCOUNTER — OFFICE VISIT (OUTPATIENT)
Dept: FAMILY MEDICINE CLINIC | Age: 50
End: 2025-08-05
Payer: COMMERCIAL

## 2025-08-05 VITALS
SYSTOLIC BLOOD PRESSURE: 128 MMHG | WEIGHT: 236.4 LBS | TEMPERATURE: 97.5 F | OXYGEN SATURATION: 97 % | HEIGHT: 68 IN | DIASTOLIC BLOOD PRESSURE: 82 MMHG | BODY MASS INDEX: 35.83 KG/M2 | HEART RATE: 78 BPM

## 2025-08-05 DIAGNOSIS — Z85.47 HISTORY OF TESTICULAR CANCER: ICD-10-CM

## 2025-08-05 DIAGNOSIS — R42 DIZZINESS: ICD-10-CM

## 2025-08-05 DIAGNOSIS — R41.82 ALTERED MENTAL STATUS, UNSPECIFIED ALTERED MENTAL STATUS TYPE: ICD-10-CM

## 2025-08-05 DIAGNOSIS — I10 PRIMARY HYPERTENSION: ICD-10-CM

## 2025-08-05 DIAGNOSIS — R41.82 ALTERED MENTAL STATUS, UNSPECIFIED ALTERED MENTAL STATUS TYPE: Primary | ICD-10-CM

## 2025-08-05 DIAGNOSIS — F41.9 ANXIETY: ICD-10-CM

## 2025-08-05 DIAGNOSIS — G25.2 INTENTION TREMOR: ICD-10-CM

## 2025-08-05 LAB
ALBUMIN: 4.3 G/DL (ref 3.5–5.2)
ALP BLD-CCNC: 86 U/L (ref 40–129)
ALT SERPL-CCNC: 36 U/L (ref 0–50)
ANION GAP SERPL CALCULATED.3IONS-SCNC: 15 MMOL/L (ref 7–16)
AST SERPL-CCNC: 30 U/L (ref 0–50)
BASOPHILS ABSOLUTE: 0.06 K/UL (ref 0–0.2)
BASOPHILS RELATIVE PERCENT: 1 % (ref 0–2)
BILIRUB SERPL-MCNC: 0.7 MG/DL (ref 0–1.2)
BUN BLDV-MCNC: 13 MG/DL (ref 6–20)
CALCIUM SERPL-MCNC: 9.8 MG/DL (ref 8.6–10)
CHLORIDE BLD-SCNC: 105 MMOL/L (ref 98–107)
CO2: 20 MMOL/L (ref 22–29)
CREAT SERPL-MCNC: 0.8 MG/DL (ref 0.7–1.2)
EOSINOPHILS ABSOLUTE: 0.18 K/UL (ref 0.05–0.5)
EOSINOPHILS RELATIVE PERCENT: 3 % (ref 0–6)
FOLATE: 9.6 NG/ML (ref 4.6–34.8)
GFR, ESTIMATED: >90 ML/MIN/1.73M2
GLUCOSE BLD-MCNC: 91 MG/DL (ref 74–99)
HCT VFR BLD CALC: 45 % (ref 37–54)
HEMOGLOBIN: 16.1 G/DL (ref 12.5–16.5)
IMMATURE GRANULOCYTES %: 1 % (ref 0–5)
IMMATURE GRANULOCYTES ABSOLUTE: 0.03 K/UL (ref 0–0.58)
LYMPHOCYTES ABSOLUTE: 1.54 K/UL (ref 1.5–4)
LYMPHOCYTES RELATIVE PERCENT: 27 % (ref 20–42)
MCH RBC QN AUTO: 32.3 PG (ref 26–35)
MCHC RBC AUTO-ENTMCNC: 35.8 G/DL (ref 32–34.5)
MCV RBC AUTO: 90.2 FL (ref 80–99.9)
MONOCYTES ABSOLUTE: 0.86 K/UL (ref 0.1–0.95)
MONOCYTES RELATIVE PERCENT: 15 % (ref 2–12)
NEUTROPHILS ABSOLUTE: 3.15 K/UL (ref 1.8–7.3)
NEUTROPHILS RELATIVE PERCENT: 54 % (ref 43–80)
PDW BLD-RTO: 12.3 % (ref 11.5–15)
PLATELET # BLD: 319 K/UL (ref 130–450)
PMV BLD AUTO: 10.7 FL (ref 7–12)
POTASSIUM SERPL-SCNC: 4.3 MMOL/L (ref 3.5–5.1)
RBC # BLD: 4.99 M/UL (ref 3.8–5.8)
SODIUM BLD-SCNC: 140 MMOL/L (ref 136–145)
TOTAL PROTEIN: 7.1 G/DL (ref 6.4–8.3)
VITAMIN B-12: 812 PG/ML (ref 232–1245)
WBC # BLD: 5.8 K/UL (ref 4.5–11.5)

## 2025-08-05 PROCEDURE — 99214 OFFICE O/P EST MOD 30 MIN: CPT | Performed by: STUDENT IN AN ORGANIZED HEALTH CARE EDUCATION/TRAINING PROGRAM

## 2025-08-05 PROCEDURE — 3074F SYST BP LT 130 MM HG: CPT | Performed by: STUDENT IN AN ORGANIZED HEALTH CARE EDUCATION/TRAINING PROGRAM

## 2025-08-05 PROCEDURE — 3079F DIAST BP 80-89 MM HG: CPT | Performed by: STUDENT IN AN ORGANIZED HEALTH CARE EDUCATION/TRAINING PROGRAM

## 2025-08-05 RX ORDER — BUPROPION HYDROCHLORIDE 150 MG/1
150 TABLET ORAL EVERY MORNING
Qty: 90 TABLET | Refills: 3 | Status: CANCELLED | OUTPATIENT
Start: 2025-08-05

## 2025-08-05 RX ORDER — LOSARTAN POTASSIUM 25 MG/1
25 TABLET ORAL DAILY
Qty: 90 TABLET | Refills: 3 | Status: SHIPPED | OUTPATIENT
Start: 2025-08-05

## 2025-08-05 RX ORDER — TADALAFIL 5 MG/1
5 TABLET ORAL DAILY
Qty: 30 TABLET | Refills: 0 | Status: SHIPPED | OUTPATIENT
Start: 2025-08-05

## 2025-08-05 ASSESSMENT — ENCOUNTER SYMPTOMS
VOMITING: 0
SHORTNESS OF BREATH: 0
NAUSEA: 0
COUGH: 0
ABDOMINAL PAIN: 0
RHINORRHEA: 0

## 2025-08-06 ENCOUNTER — TELEPHONE (OUTPATIENT)
Dept: FAMILY MEDICINE CLINIC | Age: 50
End: 2025-08-06

## 2025-08-12 ENCOUNTER — OFFICE VISIT (OUTPATIENT)
Dept: FAMILY MEDICINE CLINIC | Age: 50
End: 2025-08-12
Payer: COMMERCIAL

## 2025-08-12 VITALS
HEART RATE: 74 BPM | SYSTOLIC BLOOD PRESSURE: 138 MMHG | TEMPERATURE: 97.4 F | WEIGHT: 233 LBS | BODY MASS INDEX: 35.43 KG/M2 | OXYGEN SATURATION: 97 % | DIASTOLIC BLOOD PRESSURE: 78 MMHG

## 2025-08-12 DIAGNOSIS — R90.89 ABNORMAL CT OF BRAIN: ICD-10-CM

## 2025-08-12 DIAGNOSIS — K76.0 FATTY LIVER DISEASE, NONALCOHOLIC: ICD-10-CM

## 2025-08-12 DIAGNOSIS — S06.0X0D CONCUSSION WITHOUT LOSS OF CONSCIOUSNESS, SUBSEQUENT ENCOUNTER: Primary | ICD-10-CM

## 2025-08-12 LAB — METHYLMALONIC ACID: 0.13 UMOL/L (ref 0–0.4)

## 2025-08-12 PROCEDURE — 99214 OFFICE O/P EST MOD 30 MIN: CPT | Performed by: STUDENT IN AN ORGANIZED HEALTH CARE EDUCATION/TRAINING PROGRAM

## 2025-08-12 PROCEDURE — 3075F SYST BP GE 130 - 139MM HG: CPT | Performed by: STUDENT IN AN ORGANIZED HEALTH CARE EDUCATION/TRAINING PROGRAM

## 2025-08-12 PROCEDURE — 3078F DIAST BP <80 MM HG: CPT | Performed by: STUDENT IN AN ORGANIZED HEALTH CARE EDUCATION/TRAINING PROGRAM

## 2025-08-12 SDOH — ECONOMIC STABILITY: FOOD INSECURITY: WITHIN THE PAST 12 MONTHS, THE FOOD YOU BOUGHT JUST DIDN'T LAST AND YOU DIDN'T HAVE MONEY TO GET MORE.: NEVER TRUE

## 2025-08-12 SDOH — ECONOMIC STABILITY: FOOD INSECURITY: WITHIN THE PAST 12 MONTHS, YOU WORRIED THAT YOUR FOOD WOULD RUN OUT BEFORE YOU GOT MONEY TO BUY MORE.: NEVER TRUE

## 2025-08-12 ASSESSMENT — ENCOUNTER SYMPTOMS
VOMITING: 0
NAUSEA: 1
RHINORRHEA: 0
SHORTNESS OF BREATH: 0
ABDOMINAL PAIN: 0
COUGH: 0

## 2025-08-15 ENCOUNTER — TELEPHONE (OUTPATIENT)
Dept: FAMILY MEDICINE CLINIC | Age: 50
End: 2025-08-15

## 2025-08-16 ENCOUNTER — APPOINTMENT (OUTPATIENT)
Dept: CT IMAGING | Age: 50
DRG: 103 | End: 2025-08-16
Payer: COMMERCIAL

## 2025-08-16 ENCOUNTER — OFFICE VISIT (OUTPATIENT)
Dept: FAMILY MEDICINE CLINIC | Age: 50
End: 2025-08-16
Payer: COMMERCIAL

## 2025-08-16 ENCOUNTER — HOSPITAL ENCOUNTER (INPATIENT)
Age: 50
LOS: 2 days | Discharge: HOME | DRG: 103 | End: 2025-08-18
Attending: EMERGENCY MEDICINE | Admitting: INTERNAL MEDICINE
Payer: COMMERCIAL

## 2025-08-16 VITALS
TEMPERATURE: 97.2 F | WEIGHT: 233 LBS | BODY MASS INDEX: 35.31 KG/M2 | DIASTOLIC BLOOD PRESSURE: 60 MMHG | OXYGEN SATURATION: 98 % | HEART RATE: 74 BPM | RESPIRATION RATE: 16 BRPM | HEIGHT: 68 IN | SYSTOLIC BLOOD PRESSURE: 120 MMHG

## 2025-08-16 DIAGNOSIS — R29.90 STROKE-LIKE SYMPTOMS: ICD-10-CM

## 2025-08-16 DIAGNOSIS — S06.0X1D CONCUSSION WITH LOSS OF CONSCIOUSNESS OF 30 MINUTES OR LESS, SUBSEQUENT ENCOUNTER: Primary | ICD-10-CM

## 2025-08-16 DIAGNOSIS — G91.2 NORMAL PRESSURE HYDROCEPHALUS (HCC): Primary | ICD-10-CM

## 2025-08-16 DIAGNOSIS — R27.0 ATAXIA: ICD-10-CM

## 2025-08-16 LAB
ALBUMIN SERPL-MCNC: 4.5 G/DL (ref 3.5–5.2)
ALP SERPL-CCNC: 97 U/L (ref 40–129)
ALT SERPL-CCNC: 48 U/L (ref 0–50)
AMPHET UR QL SCN: NEGATIVE
ANION GAP SERPL CALCULATED.3IONS-SCNC: 14 MMOL/L (ref 7–16)
APAP SERPL-MCNC: <5 UG/ML (ref 10–30)
AST SERPL-CCNC: 28 U/L (ref 0–50)
BARBITURATES UR QL SCN: NEGATIVE
BASOPHILS # BLD: 0.06 K/UL (ref 0–0.2)
BASOPHILS NFR BLD: 1 % (ref 0–2)
BENZODIAZ UR QL: NEGATIVE
BILIRUB SERPL-MCNC: 0.6 MG/DL (ref 0–1.2)
BILIRUB UR QL STRIP: NEGATIVE
BUN SERPL-MCNC: 13 MG/DL (ref 6–20)
BUPRENORPHINE UR QL: NEGATIVE
CALCIUM SERPL-MCNC: 9.8 MG/DL (ref 8.6–10)
CANNABINOIDS UR QL SCN: NEGATIVE
CHLORIDE SERPL-SCNC: 104 MMOL/L (ref 98–107)
CLARITY UR: CLEAR
CO2 SERPL-SCNC: 21 MMOL/L (ref 22–29)
COCAINE UR QL SCN: NEGATIVE
COLOR UR: YELLOW
CREAT SERPL-MCNC: 0.9 MG/DL (ref 0.7–1.2)
EOSINOPHIL # BLD: 0.22 K/UL (ref 0.05–0.5)
EOSINOPHILS RELATIVE PERCENT: 4 % (ref 0–6)
ERYTHROCYTE [DISTWIDTH] IN BLOOD BY AUTOMATED COUNT: 11.9 % (ref 11.5–15)
ETHANOLAMINE SERPL-MCNC: <10 MG/DL (ref 0–0.08)
FENTANYL UR QL: NEGATIVE
GFR, ESTIMATED: >90 ML/MIN/1.73M2
GLUCOSE SERPL-MCNC: 102 MG/DL (ref 74–99)
GLUCOSE UR STRIP-MCNC: NEGATIVE MG/DL
HCT VFR BLD AUTO: 47.4 % (ref 37–54)
HGB BLD-MCNC: 17.1 G/DL (ref 12.5–16.5)
HGB UR QL STRIP.AUTO: NEGATIVE
IMM GRANULOCYTES # BLD AUTO: 0.03 K/UL (ref 0–0.58)
IMM GRANULOCYTES NFR BLD: 1 % (ref 0–5)
KETONES UR STRIP-MCNC: NEGATIVE MG/DL
LEUKOCYTE ESTERASE UR QL STRIP: NEGATIVE
LYMPHOCYTES NFR BLD: 1.61 K/UL (ref 1.5–4)
LYMPHOCYTES RELATIVE PERCENT: 27 % (ref 20–42)
MCH RBC QN AUTO: 31.7 PG (ref 26–35)
MCHC RBC AUTO-ENTMCNC: 36.1 G/DL (ref 32–34.5)
MCV RBC AUTO: 87.9 FL (ref 80–99.9)
METHADONE UR QL: NEGATIVE
MONOCYTES NFR BLD: 0.79 K/UL (ref 0.1–0.95)
MONOCYTES NFR BLD: 13 % (ref 2–12)
NEUTROPHILS NFR BLD: 54 % (ref 43–80)
NEUTS SEG NFR BLD: 3.23 K/UL (ref 1.8–7.3)
NITRITE UR QL STRIP: NEGATIVE
OPIATES UR QL SCN: NEGATIVE
OXYCODONE UR QL SCN: NEGATIVE
PCP UR QL SCN: NEGATIVE
PH UR STRIP: 6 [PH] (ref 5–8)
PLATELET # BLD AUTO: 347 K/UL (ref 130–450)
PMV BLD AUTO: 9.7 FL (ref 7–12)
POTASSIUM SERPL-SCNC: 4.5 MMOL/L (ref 3.5–5.1)
PROT SERPL-MCNC: 7.5 G/DL (ref 6.4–8.3)
PROT UR STRIP-MCNC: NEGATIVE MG/DL
RBC # BLD AUTO: 5.39 M/UL (ref 3.8–5.8)
RBC #/AREA URNS HPF: NORMAL /HPF
SALICYLATES SERPL-MCNC: <0.5 MG/DL (ref 0–30)
SODIUM SERPL-SCNC: 138 MMOL/L (ref 136–145)
SP GR UR STRIP: 1.01 (ref 1–1.03)
TEST INFORMATION: NORMAL
TOXIC TRICYCLIC SC,BLOOD: NEGATIVE
UROBILINOGEN UR STRIP-ACNC: 0.2 EU/DL (ref 0–1)
WBC #/AREA URNS HPF: NORMAL /HPF
WBC OTHER # BLD: 5.9 K/UL (ref 4.5–11.5)

## 2025-08-16 PROCEDURE — 80053 COMPREHEN METABOLIC PANEL: CPT

## 2025-08-16 PROCEDURE — 99214 OFFICE O/P EST MOD 30 MIN: CPT | Performed by: FAMILY MEDICINE

## 2025-08-16 PROCEDURE — 80307 DRUG TEST PRSMV CHEM ANLYZR: CPT

## 2025-08-16 PROCEDURE — 99222 1ST HOSP IP/OBS MODERATE 55: CPT | Performed by: INTERNAL MEDICINE

## 2025-08-16 PROCEDURE — 80179 DRUG ASSAY SALICYLATE: CPT

## 2025-08-16 PROCEDURE — 81001 URINALYSIS AUTO W/SCOPE: CPT

## 2025-08-16 PROCEDURE — 93005 ELECTROCARDIOGRAM TRACING: CPT | Performed by: EMERGENCY MEDICINE

## 2025-08-16 PROCEDURE — 3074F SYST BP LT 130 MM HG: CPT | Performed by: FAMILY MEDICINE

## 2025-08-16 PROCEDURE — 99285 EMERGENCY DEPT VISIT HI MDM: CPT

## 2025-08-16 PROCEDURE — 70498 CT ANGIOGRAPHY NECK: CPT

## 2025-08-16 PROCEDURE — 70450 CT HEAD/BRAIN W/O DYE: CPT

## 2025-08-16 PROCEDURE — 2060000000 HC ICU INTERMEDIATE R&B

## 2025-08-16 PROCEDURE — 85025 COMPLETE CBC W/AUTO DIFF WBC: CPT

## 2025-08-16 PROCEDURE — 6360000004 HC RX CONTRAST MEDICATION: Performed by: SPECIALIST

## 2025-08-16 PROCEDURE — 70496 CT ANGIOGRAPHY HEAD: CPT

## 2025-08-16 PROCEDURE — 3078F DIAST BP <80 MM HG: CPT | Performed by: FAMILY MEDICINE

## 2025-08-16 PROCEDURE — G0480 DRUG TEST DEF 1-7 CLASSES: HCPCS

## 2025-08-16 PROCEDURE — 80143 DRUG ASSAY ACETAMINOPHEN: CPT

## 2025-08-16 RX ORDER — CALCIUM CARBONATE 500 MG/1
500 TABLET, CHEWABLE ORAL 3 TIMES DAILY PRN
Status: DISCONTINUED | OUTPATIENT
Start: 2025-08-16 | End: 2025-08-18 | Stop reason: HOSPADM

## 2025-08-16 RX ORDER — IOPAMIDOL 755 MG/ML
75 INJECTION, SOLUTION INTRAVASCULAR
Status: COMPLETED | OUTPATIENT
Start: 2025-08-16 | End: 2025-08-16

## 2025-08-16 RX ORDER — POTASSIUM CHLORIDE 7.45 MG/ML
10 INJECTION INTRAVENOUS PRN
Status: DISCONTINUED | OUTPATIENT
Start: 2025-08-16 | End: 2025-08-18 | Stop reason: HOSPADM

## 2025-08-16 RX ORDER — POLYETHYLENE GLYCOL 3350 17 G/17G
17 POWDER, FOR SOLUTION ORAL DAILY PRN
Status: DISCONTINUED | OUTPATIENT
Start: 2025-08-16 | End: 2025-08-18 | Stop reason: HOSPADM

## 2025-08-16 RX ORDER — SODIUM CHLORIDE 0.9 % (FLUSH) 0.9 %
5-40 SYRINGE (ML) INJECTION PRN
Status: DISCONTINUED | OUTPATIENT
Start: 2025-08-16 | End: 2025-08-18 | Stop reason: HOSPADM

## 2025-08-16 RX ORDER — MAGNESIUM SULFATE IN WATER 40 MG/ML
2000 INJECTION, SOLUTION INTRAVENOUS PRN
Status: DISCONTINUED | OUTPATIENT
Start: 2025-08-16 | End: 2025-08-18 | Stop reason: HOSPADM

## 2025-08-16 RX ORDER — ONDANSETRON 4 MG/1
4 TABLET, ORALLY DISINTEGRATING ORAL EVERY 8 HOURS PRN
Status: DISCONTINUED | OUTPATIENT
Start: 2025-08-16 | End: 2025-08-18 | Stop reason: HOSPADM

## 2025-08-16 RX ORDER — ACETAMINOPHEN 650 MG/1
650 SUPPOSITORY RECTAL EVERY 6 HOURS PRN
Status: DISCONTINUED | OUTPATIENT
Start: 2025-08-16 | End: 2025-08-18 | Stop reason: HOSPADM

## 2025-08-16 RX ORDER — LOSARTAN POTASSIUM 50 MG/1
25 TABLET ORAL DAILY
Status: DISCONTINUED | OUTPATIENT
Start: 2025-08-17 | End: 2025-08-18 | Stop reason: HOSPADM

## 2025-08-16 RX ORDER — SODIUM CHLORIDE 0.9 % (FLUSH) 0.9 %
5-40 SYRINGE (ML) INJECTION EVERY 12 HOURS SCHEDULED
Status: DISCONTINUED | OUTPATIENT
Start: 2025-08-16 | End: 2025-08-18 | Stop reason: HOSPADM

## 2025-08-16 RX ORDER — PANTOPRAZOLE SODIUM 40 MG/1
40 TABLET, DELAYED RELEASE ORAL
Status: DISCONTINUED | OUTPATIENT
Start: 2025-08-17 | End: 2025-08-18 | Stop reason: HOSPADM

## 2025-08-16 RX ORDER — BENZONATATE 100 MG/1
100 CAPSULE ORAL 3 TIMES DAILY PRN
Status: DISCONTINUED | OUTPATIENT
Start: 2025-08-16 | End: 2025-08-18 | Stop reason: HOSPADM

## 2025-08-16 RX ORDER — HYDRALAZINE HYDROCHLORIDE 20 MG/ML
10 INJECTION INTRAMUSCULAR; INTRAVENOUS EVERY 6 HOURS PRN
Status: DISCONTINUED | OUTPATIENT
Start: 2025-08-16 | End: 2025-08-18 | Stop reason: HOSPADM

## 2025-08-16 RX ORDER — POTASSIUM CHLORIDE 1500 MG/1
40 TABLET, EXTENDED RELEASE ORAL PRN
Status: DISCONTINUED | OUTPATIENT
Start: 2025-08-16 | End: 2025-08-18 | Stop reason: HOSPADM

## 2025-08-16 RX ORDER — ACETAMINOPHEN 325 MG/1
650 TABLET ORAL EVERY 6 HOURS PRN
Status: DISCONTINUED | OUTPATIENT
Start: 2025-08-16 | End: 2025-08-18 | Stop reason: HOSPADM

## 2025-08-16 RX ORDER — SODIUM CHLORIDE 9 MG/ML
INJECTION, SOLUTION INTRAVENOUS PRN
Status: DISCONTINUED | OUTPATIENT
Start: 2025-08-16 | End: 2025-08-18 | Stop reason: HOSPADM

## 2025-08-16 RX ORDER — ONDANSETRON 2 MG/ML
4 INJECTION INTRAMUSCULAR; INTRAVENOUS EVERY 6 HOURS PRN
Status: DISCONTINUED | OUTPATIENT
Start: 2025-08-16 | End: 2025-08-18 | Stop reason: HOSPADM

## 2025-08-16 RX ADMIN — IOPAMIDOL 75 ML: 755 INJECTION, SOLUTION INTRAVENOUS at 13:58

## 2025-08-17 ENCOUNTER — APPOINTMENT (OUTPATIENT)
Dept: MRI IMAGING | Age: 50
DRG: 103 | End: 2025-08-17
Payer: COMMERCIAL

## 2025-08-17 PROBLEM — G93.89 CEREBRAL VENTRICULOMEGALY: Status: ACTIVE | Noted: 2025-08-17

## 2025-08-17 LAB
ALBUMIN SERPL-MCNC: 4.2 G/DL (ref 3.5–5.2)
ALP SERPL-CCNC: 88 U/L (ref 40–129)
ALT SERPL-CCNC: 41 U/L (ref 0–50)
ANION GAP SERPL CALCULATED.3IONS-SCNC: 13 MMOL/L (ref 7–16)
AST SERPL-CCNC: 28 U/L (ref 0–50)
BILIRUB SERPL-MCNC: 0.6 MG/DL (ref 0–1.2)
BUN SERPL-MCNC: 14 MG/DL (ref 6–20)
CALCIUM SERPL-MCNC: 9.6 MG/DL (ref 8.6–10)
CHLORIDE SERPL-SCNC: 101 MMOL/L (ref 98–107)
CHOLEST SERPL-MCNC: 196 MG/DL
CO2 SERPL-SCNC: 23 MMOL/L (ref 22–29)
CREAT SERPL-MCNC: 0.9 MG/DL (ref 0.7–1.2)
EKG ATRIAL RATE: 75 BPM
EKG P AXIS: 39 DEGREES
EKG P-R INTERVAL: 132 MS
EKG Q-T INTERVAL: 394 MS
EKG QRS DURATION: 96 MS
EKG QTC CALCULATION (BAZETT): 439 MS
EKG R AXIS: 31 DEGREES
EKG T AXIS: 30 DEGREES
EKG VENTRICULAR RATE: 75 BPM
ERYTHROCYTE [DISTWIDTH] IN BLOOD BY AUTOMATED COUNT: 12.2 % (ref 11.5–15)
GFR, ESTIMATED: >90 ML/MIN/1.73M2
GLUCOSE SERPL-MCNC: 109 MG/DL (ref 74–99)
HBA1C MFR BLD: 5.7 % (ref 4–5.6)
HCT VFR BLD AUTO: 44.8 % (ref 37–54)
HDLC SERPL-MCNC: 44 MG/DL
HGB BLD-MCNC: 15.8 G/DL (ref 12.5–16.5)
LDLC SERPL CALC-MCNC: 126 MG/DL
MAGNESIUM SERPL-MCNC: 2.1 MG/DL (ref 1.6–2.6)
MCH RBC QN AUTO: 31.5 PG (ref 26–35)
MCHC RBC AUTO-ENTMCNC: 35.3 G/DL (ref 32–34.5)
MCV RBC AUTO: 89.2 FL (ref 80–99.9)
PHOSPHATE SERPL-MCNC: 3.9 MG/DL (ref 2.5–4.5)
PLATELET # BLD AUTO: 321 K/UL (ref 130–450)
PMV BLD AUTO: 10.1 FL (ref 7–12)
POTASSIUM SERPL-SCNC: 4.4 MMOL/L (ref 3.5–5.1)
PROT SERPL-MCNC: 6.9 G/DL (ref 6.4–8.3)
RBC # BLD AUTO: 5.02 M/UL (ref 3.8–5.8)
SODIUM SERPL-SCNC: 138 MMOL/L (ref 136–145)
TRIGL SERPL-MCNC: 132 MG/DL
TSH SERPL DL<=0.05 MIU/L-ACNC: 1.85 UIU/ML (ref 0.27–4.2)
VLDLC SERPL CALC-MCNC: 26 MG/DL
WBC OTHER # BLD: 6.4 K/UL (ref 4.5–11.5)

## 2025-08-17 PROCEDURE — 70553 MRI BRAIN STEM W/O & W/DYE: CPT

## 2025-08-17 PROCEDURE — 36415 COLL VENOUS BLD VENIPUNCTURE: CPT

## 2025-08-17 PROCEDURE — 80061 LIPID PANEL: CPT

## 2025-08-17 PROCEDURE — 99222 1ST HOSP IP/OBS MODERATE 55: CPT | Performed by: NEUROLOGICAL SURGERY

## 2025-08-17 PROCEDURE — 85027 COMPLETE CBC AUTOMATED: CPT

## 2025-08-17 PROCEDURE — 83735 ASSAY OF MAGNESIUM: CPT

## 2025-08-17 PROCEDURE — 83036 HEMOGLOBIN GLYCOSYLATED A1C: CPT

## 2025-08-17 PROCEDURE — 93010 ELECTROCARDIOGRAM REPORT: CPT | Performed by: INTERNAL MEDICINE

## 2025-08-17 PROCEDURE — 80053 COMPREHEN METABOLIC PANEL: CPT

## 2025-08-17 PROCEDURE — A9579 GAD-BASE MR CONTRAST NOS,1ML: HCPCS | Performed by: RADIOLOGY

## 2025-08-17 PROCEDURE — 99223 1ST HOSP IP/OBS HIGH 75: CPT | Performed by: PSYCHIATRY & NEUROLOGY

## 2025-08-17 PROCEDURE — 84100 ASSAY OF PHOSPHORUS: CPT

## 2025-08-17 PROCEDURE — 84443 ASSAY THYROID STIM HORMONE: CPT

## 2025-08-17 PROCEDURE — 99233 SBSQ HOSP IP/OBS HIGH 50: CPT | Performed by: INTERNAL MEDICINE

## 2025-08-17 PROCEDURE — 2500000003 HC RX 250 WO HCPCS: Performed by: INTERNAL MEDICINE

## 2025-08-17 PROCEDURE — 2060000000 HC ICU INTERMEDIATE R&B

## 2025-08-17 PROCEDURE — 6370000000 HC RX 637 (ALT 250 FOR IP): Performed by: INTERNAL MEDICINE

## 2025-08-17 PROCEDURE — 72141 MRI NECK SPINE W/O DYE: CPT

## 2025-08-17 PROCEDURE — 6360000004 HC RX CONTRAST MEDICATION: Performed by: RADIOLOGY

## 2025-08-17 RX ORDER — ASCORBIC ACID 500 MG
750 TABLET ORAL DAILY
COMMUNITY

## 2025-08-17 RX ORDER — OMEPRAZOLE 20 MG/1
20 CAPSULE, DELAYED RELEASE ORAL DAILY
COMMUNITY

## 2025-08-17 RX ORDER — GADOTERIDOL 279.3 MG/ML
20 INJECTION INTRAVENOUS
Status: COMPLETED | OUTPATIENT
Start: 2025-08-17 | End: 2025-08-17

## 2025-08-17 RX ADMIN — GADOTERIDOL 20 ML: 279.3 INJECTION, SOLUTION INTRAVENOUS at 14:24

## 2025-08-17 RX ADMIN — LOSARTAN POTASSIUM 25 MG: 50 TABLET, FILM COATED ORAL at 08:09

## 2025-08-17 RX ADMIN — SODIUM CHLORIDE, PRESERVATIVE FREE 10 ML: 5 INJECTION INTRAVENOUS at 00:16

## 2025-08-17 RX ADMIN — ACETAMINOPHEN 650 MG: 325 TABLET ORAL at 19:15

## 2025-08-17 RX ADMIN — ACETAMINOPHEN 650 MG: 325 TABLET ORAL at 01:33

## 2025-08-17 RX ADMIN — PANTOPRAZOLE SODIUM 40 MG: 40 TABLET, DELAYED RELEASE ORAL at 08:09

## 2025-08-17 RX ADMIN — ACETAMINOPHEN 650 MG: 325 TABLET ORAL at 08:09

## 2025-08-17 RX ADMIN — SODIUM CHLORIDE, PRESERVATIVE FREE 10 ML: 5 INJECTION INTRAVENOUS at 20:39

## 2025-08-17 RX ADMIN — SODIUM CHLORIDE, PRESERVATIVE FREE 10 ML: 5 INJECTION INTRAVENOUS at 08:11

## 2025-08-17 ASSESSMENT — PAIN - FUNCTIONAL ASSESSMENT
PAIN_FUNCTIONAL_ASSESSMENT: 0-10
PAIN_FUNCTIONAL_ASSESSMENT: ACTIVITIES ARE NOT PREVENTED
PAIN_FUNCTIONAL_ASSESSMENT: 0-10
PAIN_FUNCTIONAL_ASSESSMENT: 0-10

## 2025-08-17 ASSESSMENT — PAIN DESCRIPTION - DESCRIPTORS
DESCRIPTORS: DISCOMFORT;SORE
DESCRIPTORS: ACHING;DISCOMFORT
DESCRIPTORS: ACHING;THROBBING;SORE
DESCRIPTORS: THROBBING;ACHING;DISCOMFORT

## 2025-08-17 ASSESSMENT — PAIN DESCRIPTION - LOCATION
LOCATION: HEAD

## 2025-08-17 ASSESSMENT — PAIN SCALES - GENERAL
PAINLEVEL_OUTOF10: 3
PAINLEVEL_OUTOF10: 0
PAINLEVEL_OUTOF10: 3
PAINLEVEL_OUTOF10: 5
PAINLEVEL_OUTOF10: 5
PAINLEVEL_OUTOF10: 0
PAINLEVEL_OUTOF10: 4

## 2025-08-17 ASSESSMENT — PAIN DESCRIPTION - ORIENTATION
ORIENTATION: MID
ORIENTATION: RIGHT;LEFT
ORIENTATION: POSTERIOR;UPPER
ORIENTATION: UPPER;PROXIMAL

## 2025-08-18 VITALS
SYSTOLIC BLOOD PRESSURE: 150 MMHG | RESPIRATION RATE: 17 BRPM | DIASTOLIC BLOOD PRESSURE: 105 MMHG | OXYGEN SATURATION: 95 % | TEMPERATURE: 97.3 F | HEART RATE: 76 BPM

## 2025-08-18 DIAGNOSIS — S06.0X1A CONCUSSION WITH LOSS OF CONSCIOUSNESS OF 30 MINUTES OR LESS, INITIAL ENCOUNTER: Primary | ICD-10-CM

## 2025-08-18 DIAGNOSIS — M62.838 TRAPEZIUS MUSCLE SPASM: ICD-10-CM

## 2025-08-18 DIAGNOSIS — R47.9 SPEECH DIFFICULT TO UNDERSTAND: ICD-10-CM

## 2025-08-18 DIAGNOSIS — M54.81 BILATERAL OCCIPITAL NEURALGIA: ICD-10-CM

## 2025-08-18 PROCEDURE — 2500000003 HC RX 250 WO HCPCS: Performed by: INTERNAL MEDICINE

## 2025-08-18 PROCEDURE — 97161 PT EVAL LOW COMPLEX 20 MIN: CPT

## 2025-08-18 PROCEDURE — 97165 OT EVAL LOW COMPLEX 30 MIN: CPT

## 2025-08-18 PROCEDURE — 6370000000 HC RX 637 (ALT 250 FOR IP): Performed by: INTERNAL MEDICINE

## 2025-08-18 PROCEDURE — 97530 THERAPEUTIC ACTIVITIES: CPT

## 2025-08-18 PROCEDURE — 99232 SBSQ HOSP IP/OBS MODERATE 35: CPT | Performed by: INTERNAL MEDICINE

## 2025-08-18 PROCEDURE — 99233 SBSQ HOSP IP/OBS HIGH 50: CPT | Performed by: CLINICAL NURSE SPECIALIST

## 2025-08-18 RX ADMIN — PANTOPRAZOLE SODIUM 40 MG: 40 TABLET, DELAYED RELEASE ORAL at 05:55

## 2025-08-18 RX ADMIN — ACETAMINOPHEN 650 MG: 325 TABLET ORAL at 08:43

## 2025-08-18 RX ADMIN — LOSARTAN POTASSIUM 25 MG: 50 TABLET, FILM COATED ORAL at 08:41

## 2025-08-18 RX ADMIN — SODIUM CHLORIDE, PRESERVATIVE FREE 10 ML: 5 INJECTION INTRAVENOUS at 08:45

## 2025-08-18 ASSESSMENT — PAIN - FUNCTIONAL ASSESSMENT
PAIN_FUNCTIONAL_ASSESSMENT: ACTIVITIES ARE NOT PREVENTED
PAIN_FUNCTIONAL_ASSESSMENT: 0-10

## 2025-08-18 ASSESSMENT — PAIN DESCRIPTION - ONSET: ONSET: GRADUAL

## 2025-08-18 ASSESSMENT — PAIN DESCRIPTION - FREQUENCY: FREQUENCY: INTERMITTENT

## 2025-08-18 ASSESSMENT — PAIN SCALES - GENERAL
PAINLEVEL_OUTOF10: 2
PAINLEVEL_OUTOF10: 5

## 2025-08-18 ASSESSMENT — PAIN DESCRIPTION - LOCATION: LOCATION: HEAD

## 2025-08-18 ASSESSMENT — PAIN DESCRIPTION - DESCRIPTORS: DESCRIPTORS: ACHING;THROBBING;SHOOTING

## 2025-08-19 ENCOUNTER — TELEPHONE (OUTPATIENT)
Dept: FAMILY MEDICINE CLINIC | Age: 50
End: 2025-08-19

## 2025-08-19 ENCOUNTER — TELEPHONE (OUTPATIENT)
Age: 50
End: 2025-08-19

## 2025-08-19 ENCOUNTER — CARE COORDINATION (OUTPATIENT)
Dept: CARE COORDINATION | Age: 50
End: 2025-08-19

## 2025-08-19 DIAGNOSIS — G91.2 NORMAL PRESSURE HYDROCEPHALUS (HCC): Primary | ICD-10-CM

## 2025-08-21 ENCOUNTER — TELEPHONE (OUTPATIENT)
Dept: FAMILY MEDICINE CLINIC | Age: 50
End: 2025-08-21

## 2025-08-26 ENCOUNTER — OFFICE VISIT (OUTPATIENT)
Dept: FAMILY MEDICINE CLINIC | Age: 50
End: 2025-08-26

## 2025-08-26 ENCOUNTER — PATIENT MESSAGE (OUTPATIENT)
Dept: FAMILY MEDICINE CLINIC | Age: 50
End: 2025-08-26

## 2025-08-26 VITALS
WEIGHT: 236 LBS | HEART RATE: 74 BPM | DIASTOLIC BLOOD PRESSURE: 82 MMHG | SYSTOLIC BLOOD PRESSURE: 132 MMHG | BODY MASS INDEX: 35.88 KG/M2 | OXYGEN SATURATION: 97 % | TEMPERATURE: 97.4 F

## 2025-08-26 DIAGNOSIS — S06.0X1D CONCUSSION WITH LOSS OF CONSCIOUSNESS OF 30 MINUTES OR LESS, SUBSEQUENT ENCOUNTER: ICD-10-CM

## 2025-08-26 DIAGNOSIS — G44.86 CERVICOGENIC HEADACHE: ICD-10-CM

## 2025-08-26 DIAGNOSIS — G93.89 CEREBRAL VENTRICULOMEGALY: ICD-10-CM

## 2025-08-26 DIAGNOSIS — Z09 HOSPITAL DISCHARGE FOLLOW-UP: Primary | ICD-10-CM

## 2025-08-26 DIAGNOSIS — G91.2 NORMAL PRESSURE HYDROCEPHALUS (HCC): ICD-10-CM

## 2025-08-26 RX ORDER — CYCLOBENZAPRINE HCL 10 MG
10 TABLET ORAL 2 TIMES DAILY PRN
Qty: 30 TABLET | Refills: 0 | Status: SHIPPED | OUTPATIENT
Start: 2025-08-26 | End: 2025-09-05

## 2025-08-27 ENCOUNTER — CARE COORDINATION (OUTPATIENT)
Dept: CARE COORDINATION | Age: 50
End: 2025-08-27

## 2025-08-29 ENCOUNTER — OFFICE VISIT (OUTPATIENT)
Age: 50
End: 2025-08-29
Payer: COMMERCIAL

## 2025-08-29 VITALS
HEART RATE: 74 BPM | OXYGEN SATURATION: 96 % | HEIGHT: 68 IN | WEIGHT: 238 LBS | TEMPERATURE: 99.7 F | SYSTOLIC BLOOD PRESSURE: 143 MMHG | BODY MASS INDEX: 36.07 KG/M2 | DIASTOLIC BLOOD PRESSURE: 101 MMHG

## 2025-08-29 DIAGNOSIS — G91.2 NORMAL PRESSURE HYDROCEPHALUS (HCC): ICD-10-CM

## 2025-08-29 DIAGNOSIS — R27.0 ATAXIA: ICD-10-CM

## 2025-08-29 DIAGNOSIS — G93.89 CEREBRAL VENTRICULOMEGALY: Primary | ICD-10-CM

## 2025-08-29 PROCEDURE — 3077F SYST BP >= 140 MM HG: CPT | Performed by: PHYSICIAN ASSISTANT

## 2025-08-29 PROCEDURE — 99214 OFFICE O/P EST MOD 30 MIN: CPT | Performed by: PHYSICIAN ASSISTANT

## 2025-08-29 PROCEDURE — 3080F DIAST BP >= 90 MM HG: CPT | Performed by: PHYSICIAN ASSISTANT

## 2025-09-03 ENCOUNTER — CARE COORDINATION (OUTPATIENT)
Dept: CARE COORDINATION | Age: 50
End: 2025-09-03

## 2025-09-04 ENCOUNTER — PATIENT MESSAGE (OUTPATIENT)
Age: 50
End: 2025-09-04

## 2025-09-04 ENCOUNTER — CARE COORDINATION (OUTPATIENT)
Dept: CARE COORDINATION | Age: 50
End: 2025-09-04

## 2025-09-04 DIAGNOSIS — M62.830 SPASM OF BOTH TRAPEZIUS MUSCLES: Primary | ICD-10-CM

## 2025-09-04 DIAGNOSIS — M54.81 BILATERAL OCCIPITAL NEURALGIA: ICD-10-CM
